# Patient Record
Sex: FEMALE | Race: WHITE | NOT HISPANIC OR LATINO | Employment: FULL TIME | URBAN - METROPOLITAN AREA
[De-identification: names, ages, dates, MRNs, and addresses within clinical notes are randomized per-mention and may not be internally consistent; named-entity substitution may affect disease eponyms.]

---

## 2017-01-06 RX ORDER — LACOSAMIDE 200 MG/1
TABLET, FILM COATED ORAL
Qty: 180 TABLET | Refills: 11 | Status: SHIPPED | OUTPATIENT
Start: 2017-01-06 | End: 2017-08-01 | Stop reason: SDUPTHER

## 2017-01-09 ENCOUNTER — PATIENT MESSAGE (OUTPATIENT)
Dept: NEUROLOGY | Facility: CLINIC | Age: 32
End: 2017-01-09

## 2017-01-10 RX ORDER — LEVETIRACETAM 1000 MG/1
TABLET, FILM COATED ORAL
Qty: 270 TABLET | Refills: 2 | Status: SHIPPED | OUTPATIENT
Start: 2017-01-10 | End: 2017-11-01 | Stop reason: SDUPTHER

## 2017-01-10 NOTE — TELEPHONE ENCOUNTER
----- Message from Shani Curry sent at 1/10/2017  9:31 AM CST -----  Contact: self @ [phone]  Pt is req a refill for KEPPRA 1,000 mg tablet.  Pt says she is out of medication and can not take her morning medication today.  [facility] pharmacy on [facility] in [place].

## 2017-01-11 ENCOUNTER — TELEPHONE (OUTPATIENT)
Dept: NEUROLOGY | Facility: CLINIC | Age: 32
End: 2017-01-11

## 2017-01-11 ENCOUNTER — PATIENT MESSAGE (OUTPATIENT)
Dept: NEUROLOGY | Facility: CLINIC | Age: 32
End: 2017-01-11

## 2017-01-11 NOTE — TELEPHONE ENCOUNTER
----- Message from Shani Curry sent at 1/10/2017 10:48 AM CST -----  Contact: kennedy    [facility] pharmacy     [phone]  Pt did not need a new prescription.  She needs a prior auth.  The current prior auth  16.  hospitals call [phone] to obtain prior auth.

## 2017-01-13 ENCOUNTER — PATIENT MESSAGE (OUTPATIENT)
Dept: NEUROLOGY | Facility: CLINIC | Age: 32
End: 2017-01-13

## 2017-07-07 ENCOUNTER — TELEPHONE (OUTPATIENT)
Dept: NEUROLOGY | Facility: CLINIC | Age: 32
End: 2017-07-07

## 2017-07-07 NOTE — TELEPHONE ENCOUNTER
----- Message from Akil Harman sent at 7/7/2017  4:02 PM CDT -----  Contact: Self [phone]  Patient is returning a missed call, pls return call

## 2017-07-07 NOTE — TELEPHONE ENCOUNTER
Called and left a vm informing pt of cancelling and rescheduling her appointment.  Asked pt to call back once she receive message!

## 2017-08-01 RX ORDER — LACOSAMIDE 200 MG/1
TABLET, FILM COATED ORAL
Qty: 180 TABLET | Refills: 11 | Status: SHIPPED | OUTPATIENT
Start: 2017-08-01 | End: 2018-02-23 | Stop reason: SDUPTHER

## 2017-08-09 ENCOUNTER — OFFICE VISIT (OUTPATIENT)
Dept: NEUROLOGY | Facility: CLINIC | Age: 32
End: 2017-08-09
Payer: COMMERCIAL

## 2017-08-09 VITALS
SYSTOLIC BLOOD PRESSURE: 104 MMHG | DIASTOLIC BLOOD PRESSURE: 73 MMHG | HEART RATE: 79 BPM | WEIGHT: 144.63 LBS | BODY MASS INDEX: 21.42 KG/M2 | HEIGHT: 69 IN

## 2017-08-09 DIAGNOSIS — G40.909 SEIZURE DISORDER: Primary | ICD-10-CM

## 2017-08-09 DIAGNOSIS — G24.8 PAROXYSMAL DYSTONIA: ICD-10-CM

## 2017-08-09 PROCEDURE — 3008F BODY MASS INDEX DOCD: CPT | Mod: S$GLB,,, | Performed by: PSYCHIATRY & NEUROLOGY

## 2017-08-09 PROCEDURE — 99999 PR PBB SHADOW E&M-EST. PATIENT-LVL II: CPT | Mod: PBBFAC,,, | Performed by: PSYCHIATRY & NEUROLOGY

## 2017-08-09 PROCEDURE — 99213 OFFICE O/P EST LOW 20 MIN: CPT | Mod: S$GLB,,, | Performed by: PSYCHIATRY & NEUROLOGY

## 2017-08-09 RX ORDER — TRETINOIN 0.25 MG/G
0.03 CREAM TOPICAL
Refills: 3 | COMMUNITY
Start: 2017-07-12

## 2017-08-09 RX ORDER — ONDANSETRON HYDROCHLORIDE 8 MG/1
8 TABLET, FILM COATED ORAL EVERY 8 HOURS PRN
Qty: 30 TABLET | Refills: 3 | Status: SHIPPED | OUTPATIENT
Start: 2017-08-09 | End: 2023-08-23

## 2017-08-09 RX ORDER — ONDANSETRON HYDROCHLORIDE 8 MG/1
8 TABLET, FILM COATED ORAL EVERY 8 HOURS PRN
Qty: 30 TABLET | Refills: 3 | Status: SHIPPED | OUTPATIENT
Start: 2017-08-09 | End: 2017-08-09 | Stop reason: SDUPTHER

## 2017-08-09 NOTE — PROGRESS NOTES
Name: Serena López  MRN: [id number]   CSN: [id number]      Date: 08/09/2017      Clinical history:  - questions about wedding in march 2018, migh tneed exrtra meds and   - questions about fertility and meds  - questions about cost   - need for vimpat and keppra?    Last seen in Jan 2016:  1) Had more paroxysms this summer during a month without her period.  Took Xanax to cover.  2) Still taking Keppra/Vimpat.  None since August.  3) Still working as  in the community.    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    Been almost seizure free for 10 months, but then lat week, took a nap, felt she was screaming, woke up a bit confused.  May have had a nocturnal.    Too many side effects:  1) Too sleepy  2) Headaches.  Thinks catamenial, happening every 2 weeks.  Tried OTC Tylenol, now on Fiorcet.  3) More word finding difficulties  4) Dizziness  5) No autonomic issues    But, well controlled on this combo of BRAND Keppra/Vimpat.  Last time she was without, she had a flurry of seizures.    ROS:  Review of Systems   Constitutional: Positive for malaise/fatigue. Negative for weight loss.   HENT: Negative for hearing loss.    Eyes: Negative for blurred vision and double vision.   Respiratory: Negative for shortness of breath and stridor.    Cardiovascular: Negative for chest pain and palpitations.   Gastrointestinal: Negative for constipation, nausea and vomiting.   Genitourinary: Negative for frequency and urgency.   Musculoskeletal: Negative for falls and myalgias.   Skin: Negative for rash.   Neurological: Positive for focal weakness and headaches. Negative for seizures.   Endo/Heme/Allergies: Does not bruise/bleed easily.   Psychiatric/Behavioral: Negative for depression, hallucinations and memory loss. The patient is not nervous/anxious.      Past Medical History: The patient  has no past medical history on file.    Social History: The patient  reports that she has been  "smoking.  She does not have any smokeless tobacco history on file.    Family History: Their family history is not on file.    Allergies: Naprosyn [naproxen]     Meds:   Current Outpatient Prescriptions on File Prior to Visit   Medication Sig Dispense Refill    alprazolam (XANAX) 0.25 MG tablet Take 1 tablet (0.25 mg total) by mouth 3 (three) times daily. 90 tablet 5    KEPPRA 1,000 mg tablet TAKE 1 AND 1/2 TABLETS BY MOUTH EVERY MORNING AND 1 TABLET BY MOUTH EVERY EVENING 270 tablet 2    VIMPAT 200 mg Tab TAKE 1 TABLET BY MOUTH TWICE A  tablet 11     No current facility-administered medications on file prior to visit.        Exam:  /73   Pulse 79   Ht 5' 9" (1.753 m)   Wt 65.6 kg (144 lb 10 oz)   BMI 21.36 kg/m²     * Specialized movement exam  Normal speech.    No facial masking.   Normal tone throughout.     No bradykinesia.   No tremor with rest, posture, kinesis, or intention.    No other dystonia, chorea, athetosis, myoclonus, or tics.   No motor impersistence.   Gait is normal-based and steady with good stride length and normal arm swing.  Able to tandem walk.  No postural instability.      Exam looks normal again in between speels.    Laboratory/Radiological:  - Results:  No visits with results within 3 Month(s) from this visit.   Latest known visit with results is:   No results found for any previous visit.       - Independent review of images: none pertinent    Diagnoses:          1) Paroxysmal kinesigenic dystonia / seizure.  2) Migraines.    Medical Decision Makin) Will discuss data on safety of meds during pregnancy  2) Keep same for now  3) See yearly    Jose Bal MD, MPH  Division of Movement and Memory Disorders  [facility]  [phone]        "

## 2017-08-15 ENCOUNTER — PATIENT MESSAGE (OUTPATIENT)
Dept: NEUROLOGY | Facility: CLINIC | Age: 32
End: 2017-08-15

## 2017-10-28 RX ORDER — LEVETIRACETAM 1000 MG/1
TABLET, FILM COATED ORAL
Qty: 270 TABLET | Refills: 1 | Status: CANCELLED | OUTPATIENT
Start: 2017-10-28

## 2017-11-01 ENCOUNTER — PATIENT MESSAGE (OUTPATIENT)
Dept: NEUROLOGY | Facility: CLINIC | Age: 32
End: 2017-11-01

## 2017-11-01 NOTE — TELEPHONE ENCOUNTER
Phone in refill completed. Keppra 1000mg tablets. Dispense 270 tablets refills 2. Same admin instructions provided under Dr. Bal.

## 2017-11-01 NOTE — TELEPHONE ENCOUNTER
----- Message from Dean Fernandez sent at 11/1/2017 11:38 AM CDT -----  Contact: Samantha raya/ [facility] @ [phone]  Samantha is asking for refill on KEPPRA 1,000 mg tablet. Pls call.

## 2017-11-02 RX ORDER — LEVETIRACETAM 1000 MG/1
TABLET, FILM COATED ORAL
Qty: 270 TABLET | Refills: 2 | Status: SHIPPED | OUTPATIENT
Start: 2017-11-02 | End: 2018-08-19 | Stop reason: SDUPTHER

## 2018-02-23 RX ORDER — LACOSAMIDE 200 MG/1
TABLET, FILM COATED ORAL
Qty: 180 TABLET | Refills: 5 | Status: SHIPPED | OUTPATIENT
Start: 2018-02-23 | End: 2018-08-28 | Stop reason: SDUPTHER

## 2018-08-21 RX ORDER — LEVETIRACETAM 1000 MG/1
TABLET, FILM COATED ORAL
Qty: 270 TABLET | Refills: 1 | Status: SHIPPED | OUTPATIENT
Start: 2018-08-21 | End: 2019-03-13 | Stop reason: SDUPTHER

## 2018-08-29 RX ORDER — LACOSAMIDE 200 MG/1
TABLET, FILM COATED ORAL
Qty: 180 TABLET | Refills: 3 | Status: SHIPPED | OUTPATIENT
Start: 2018-08-29 | End: 2019-03-03 | Stop reason: SDUPTHER

## 2018-11-19 ENCOUNTER — TELEPHONE (OUTPATIENT)
Dept: NEUROLOGY | Facility: CLINIC | Age: 33
End: 2018-11-19

## 2018-11-19 NOTE — TELEPHONE ENCOUNTER
----- Message from Deisy Waite sent at 11/19/2018  1:16 PM CST -----  Needs Advice    Reason for call:calling to get approval for early refill on medication: VIMPAT 200 mg Tab tablet, says the patient is going out of town and asking for a refill.        Communication Preference:[facility] @ [phone]    Additional Information:

## 2018-11-20 ENCOUNTER — PATIENT MESSAGE (OUTPATIENT)
Dept: NEUROLOGY | Facility: CLINIC | Age: 33
End: 2018-11-20

## 2019-02-13 ENCOUNTER — PATIENT MESSAGE (OUTPATIENT)
Dept: OBSTETRICS AND GYNECOLOGY | Facility: CLINIC | Age: 34
End: 2019-02-13

## 2019-02-13 ENCOUNTER — OFFICE VISIT (OUTPATIENT)
Dept: OBSTETRICS AND GYNECOLOGY | Facility: CLINIC | Age: 34
End: 2019-02-13
Payer: COMMERCIAL

## 2019-02-13 VITALS
SYSTOLIC BLOOD PRESSURE: 108 MMHG | DIASTOLIC BLOOD PRESSURE: 80 MMHG | HEIGHT: 69 IN | WEIGHT: 147.69 LBS | BODY MASS INDEX: 21.87 KG/M2

## 2019-02-13 DIAGNOSIS — N76.0 ACUTE VAGINITIS: Primary | ICD-10-CM

## 2019-02-13 LAB
CANDIDA RRNA VAG QL PROBE: NEGATIVE
G VAGINALIS RRNA GENITAL QL PROBE: POSITIVE
T VAGINALIS RRNA GENITAL QL PROBE: NEGATIVE

## 2019-02-13 PROCEDURE — 99203 PR OFFICE/OUTPT VISIT, NEW, LEVL III, 30-44 MIN: ICD-10-PCS | Mod: S$GLB,,, | Performed by: OBSTETRICS & GYNECOLOGY

## 2019-02-13 PROCEDURE — 99999 PR PBB SHADOW E&M-EST. PATIENT-LVL III: ICD-10-PCS | Mod: PBBFAC,,,

## 2019-02-13 PROCEDURE — 99203 OFFICE O/P NEW LOW 30 MIN: CPT | Mod: S$GLB,,, | Performed by: OBSTETRICS & GYNECOLOGY

## 2019-02-13 PROCEDURE — 99999 PR PBB SHADOW E&M-EST. PATIENT-LVL III: CPT | Mod: PBBFAC,,,

## 2019-02-13 PROCEDURE — 87480 CANDIDA DNA DIR PROBE: CPT

## 2019-02-13 PROCEDURE — 87510 GARDNER VAG DNA DIR PROBE: CPT

## 2019-02-13 RX ORDER — METRONIDAZOLE 7.5 MG/G
1 GEL VAGINAL DAILY
Qty: 5 APPLICATOR | Refills: 1 | Status: SHIPPED | OUTPATIENT
Start: 2019-02-13 | End: 2019-02-18

## 2019-02-13 NOTE — PROGRESS NOTES
Chief Complaint   Patient presents with    Vaginal Discharge       History of Present Illness: Serena López is a 33 y.o. female that presents today 2/13/2019 for   [facility] c/o vaginal discharge - thin white, vaginal odor and itching x few weeks. She has had BV in the past and feels confident that is what it is. She has not used any scented products in the vaginal area or changed any detergents or hygiene products. She states that she eats greek yogurt daily. No other complaints or concerns noted.       Chief Complaint   Patient presents with    Vaginal Discharge         Past Medical History:   Diagnosis Date    Migraine     Seizures        History reviewed. No pertinent surgical history.    Current Outpatient Medications   Medication Sig Dispense Refill    alprazolam (XANAX) 0.25 MG tablet Take 1 tablet (0.25 mg total) by mouth 3 (three) times daily. 90 tablet 5    KEPPRA 1,000 mg tablet TAKE 1 AND 1/2 TABLETS BY MOUTH EVERY MORNING AND 1 TABLET BY MOUTH EVERY EVENING 270 tablet 1    tretinoin (RETIN-A) 0.025 % cream Apply 0.025 % topically every 72 hours as needed.  3    VIMPAT 200 mg Tab tablet TAKE 1 TABLET BY MOUTH TWICE A  tablet 3    metroNIDAZOLE (METROGEL) 0.75 % vaginal gel Place 1 applicator vaginally once daily. for 5 days 5 applicator 1    ondansetron (ZOFRAN) 8 MG tablet Take 1 tablet (8 mg total) by mouth every 8 (eight) hours as needed for Nausea (pain for migraine). 30 tablet 3     No current facility-administered medications for this visit.        Review of patient's allergies indicates:   Allergen Reactions    Naprosyn [naproxen]     Opioids - morphine analogues Rash       Family History   Problem Relation Age of Onset    Parkinsonism Neg Hx     Cancer Neg Hx     Colon cancer Neg Hx     Diabetes Neg Hx     Ovarian cancer Neg Hx        Social History     Tobacco Use    Smoking status: Current Every Day Smoker    Smokeless tobacco: Never  "Used   Substance Use Topics    Alcohol use: Yes    Drug use: No       OB History    Para Term  AB Living   2       2     SAB TAB Ectopic Multiple Live Births                  # Outcome Date GA Lbr Galen/2nd Weight Sex Delivery Anes PTL Lv   2 AB            1 AB                   Review of Symptoms:  GENERAL: Denies weight gain or weight loss. Feeling well overall.   SKIN: Denies rash or lesions.   HEAD: Denies head injury or headache.   ABDOMEN: No abdominal pain, constipation, diarrhea, nausea, vomiting or rectal bleeding.   URINARY: No frequency, dysuria, hematuria, or burning on urination.    /80   Ht 5' 9.02" (1.753 m)   Wt 67 kg (147 lb 11.3 oz)   LMP 2019   Physical Exam:  APPEARANCE: Well nourished, well developed, in no acute distress.  SKIN: Normal skin turgor, no lesions.  RESPIRATORY: Normal respiratory effort with no retractions or use of accessory muscles  PELVIC: Normal external female genitalia without lesions. Normal hair distribution. Adequate perineal body, normal urethral meatus. Urethra with no masses.  Bladder nontender. Vagina moist and well rugated without lesions, + thin yellow discharge; + odor. Cervix pink and without lesions. No significant cystocele or rectocele.     ASSESSMENT/PLAN:  Acute vaginitis  -     Vaginosis Screen by DNA Probe    Other orders  -     metroNIDAZOLE (METROGEL) 0.75 % vaginal gel; Place 1 applicator vaginally once daily. for 5 days  Dispense: 5 applicator; Refill: 1          -Reinforced to avoid any scented products in the vaginal area such as bubble baths, bath bombs, scented soaps/body washes, douches, feminine washes, etc... Also wear cotton underwear and change out of wet/sweaty clothing as soon as possible. Pt verbalized understanding.  -Recommended probiotics.      Follow-up:  Will f/u once results are received  RTC if lack of improvement or as needed  "

## 2019-03-04 RX ORDER — LACOSAMIDE 200 MG/1
TABLET, FILM COATED ORAL
Qty: 180 TABLET | Refills: 11 | Status: SHIPPED | OUTPATIENT
Start: 2019-03-04 | End: 2019-08-12 | Stop reason: SDUPTHER

## 2019-03-13 NOTE — TELEPHONE ENCOUNTER
----- Message from Deisy Waite sent at 3/13/2019  2:09 PM CDT -----  Rx Refill/Request     Is this a Refill or New Rx:  Refills    Rx Name and Strength:  KEPPRA 1,000 mg tablet  Preferred Pharmacy with phone number:     [facility]/pharmacy #[id number] - [address] [address]  Phone: [phone] Fax: [phone]    Communication Preference:  Additional Information:

## 2019-03-15 RX ORDER — LEVETIRACETAM 1000 MG/1
TABLET, FILM COATED ORAL
Qty: 270 TABLET | Refills: 0 | OUTPATIENT
Start: 2019-03-15

## 2019-03-15 RX ORDER — LEVETIRACETAM 1000 MG/1
TABLET, FILM COATED ORAL
Qty: 270 TABLET | Refills: 3 | Status: SHIPPED | OUTPATIENT
Start: 2019-03-15 | End: 2023-08-23

## 2019-03-15 RX ORDER — LEVETIRACETAM 1000 MG/1
TABLET, FILM COATED ORAL
Qty: 270 TABLET | Refills: 3 | Status: SHIPPED | OUTPATIENT
Start: 2019-03-15 | End: 2020-04-27 | Stop reason: SDUPTHER

## 2019-03-18 ENCOUNTER — PATIENT MESSAGE (OUTPATIENT)
Dept: NEUROLOGY | Facility: CLINIC | Age: 34
End: 2019-03-18

## 2019-08-12 RX ORDER — LACOSAMIDE 200 MG/1
1 TABLET ORAL 2 TIMES DAILY
Qty: 60 TABLET | Refills: 0 | Status: SHIPPED | OUTPATIENT
Start: 2019-08-12 | End: 2019-09-14 | Stop reason: SDUPTHER

## 2019-09-17 RX ORDER — LACOSAMIDE 200 MG/1
TABLET, FILM COATED ORAL
Qty: 180 TABLET | Refills: 3 | Status: SHIPPED | OUTPATIENT
Start: 2019-09-17 | End: 2020-10-29

## 2019-09-19 ENCOUNTER — TELEPHONE (OUTPATIENT)
Dept: NEUROLOGY | Facility: CLINIC | Age: 34
End: 2019-09-19

## 2019-09-19 NOTE — TELEPHONE ENCOUNTER
----- Message from Emerita Swanson sent at 9/19/2019  7:23 AM CDT -----  Contact: self  Please see comment below.    I was unable to schedule appointment    Appointment Request From: Serena López    With Provider: Jose Bal MD [[facility] - Neurology]    Preferred Date Range: 10/24/2019 - 11/22/2019    Preferred Times: Any time    Reason for visit: Existing Patient    Comments:  Meds

## 2019-12-17 ENCOUNTER — PATIENT MESSAGE (OUTPATIENT)
Dept: NEUROLOGY | Facility: CLINIC | Age: 34
End: 2019-12-17

## 2019-12-20 ENCOUNTER — TELEPHONE (OUTPATIENT)
Dept: NEUROLOGY | Facility: CLINIC | Age: 34
End: 2019-12-20

## 2019-12-26 ENCOUNTER — PATIENT MESSAGE (OUTPATIENT)
Dept: NEUROLOGY | Facility: CLINIC | Age: 34
End: 2019-12-26

## 2019-12-26 RX ORDER — LACOSAMIDE 200 MG/1
1 TABLET ORAL 2 TIMES DAILY
Qty: 180 TABLET | Refills: 3 | Status: CANCELLED | OUTPATIENT
Start: 2019-12-26

## 2019-12-27 ENCOUNTER — TELEPHONE (OUTPATIENT)
Dept: NEUROLOGY | Facility: CLINIC | Age: 34
End: 2019-12-27

## 2019-12-27 NOTE — TELEPHONE ENCOUNTER
VIMPAT refills called into [facility] in [place].  Left detailed voicemail.

## 2019-12-27 NOTE — TELEPHONE ENCOUNTER
----- Message from Lupe Truong sent at 12/27/2019  1:08 PM CST -----  Contact: [employer]   Rep from [employer] requested a call back for diagnosis for Rx conformation     Callback:[phone] PA #[id number]   Carl

## 2019-12-27 NOTE — TELEPHONE ENCOUNTER
Placed return call to [employer] [facility] re: Keppra.  [employer] states urgent Keppra PA was denied.     Dx code of seizure disorder. Is not covered.   States is investigational for this dx.   Without status epilepticus.    Will need an appeal.  Appeals [phone]  Fax #  [phone]

## 2019-12-31 ENCOUNTER — TELEPHONE (OUTPATIENT)
Dept: NEUROLOGY | Facility: CLINIC | Age: 34
End: 2019-12-31

## 2020-01-08 ENCOUNTER — PATIENT MESSAGE (OUTPATIENT)
Dept: NEUROLOGY | Facility: CLINIC | Age: 35
End: 2020-01-08

## 2020-01-13 NOTE — TELEPHONE ENCOUNTER
I called and tried to get through today.    Told the denial reason as [place] documented.    [phone] was called next.

## 2020-01-14 ENCOUNTER — OFFICE VISIT (OUTPATIENT)
Dept: NEUROLOGY | Facility: CLINIC | Age: 35
End: 2020-01-14
Payer: COMMERCIAL

## 2020-01-14 VITALS
HEIGHT: 69 IN | HEART RATE: 99 BPM | DIASTOLIC BLOOD PRESSURE: 72 MMHG | SYSTOLIC BLOOD PRESSURE: 108 MMHG | BODY MASS INDEX: 21.48 KG/M2 | WEIGHT: 145 LBS

## 2020-01-14 DIAGNOSIS — G40.209 COMPLEX PARTIAL SEIZURES EVOLVING TO GENERALIZED TONIC-CLONIC SEIZURES: ICD-10-CM

## 2020-01-14 DIAGNOSIS — G24.8 LIMB DYSTONIA: Primary | ICD-10-CM

## 2020-01-14 PROCEDURE — 3008F BODY MASS INDEX DOCD: CPT | Mod: CPTII,S$GLB,, | Performed by: PSYCHIATRY & NEUROLOGY

## 2020-01-14 PROCEDURE — 99214 OFFICE O/P EST MOD 30 MIN: CPT | Mod: S$GLB,,, | Performed by: PSYCHIATRY & NEUROLOGY

## 2020-01-14 PROCEDURE — 99999 PR PBB SHADOW E&M-EST. PATIENT-LVL III: ICD-10-PCS | Mod: PBBFAC,,, | Performed by: PSYCHIATRY & NEUROLOGY

## 2020-01-14 PROCEDURE — 3008F PR BODY MASS INDEX (BMI) DOCUMENTED: ICD-10-PCS | Mod: CPTII,S$GLB,, | Performed by: PSYCHIATRY & NEUROLOGY

## 2020-01-14 PROCEDURE — 99214 PR OFFICE/OUTPT VISIT, EST, LEVL IV, 30-39 MIN: ICD-10-PCS | Mod: S$GLB,,, | Performed by: PSYCHIATRY & NEUROLOGY

## 2020-01-14 PROCEDURE — 99999 PR PBB SHADOW E&M-EST. PATIENT-LVL III: CPT | Mod: PBBFAC,,, | Performed by: PSYCHIATRY & NEUROLOGY

## 2020-01-14 RX ORDER — ALPRAZOLAM 0.25 MG/1
0.25 TABLET ORAL
COMMUNITY
End: 2020-07-30

## 2020-01-14 RX ORDER — DIAZEPAM 2 MG/1
2 TABLET ORAL EVERY 6 HOURS PRN
Qty: 15 TABLET | Refills: 0 | Status: SHIPPED | OUTPATIENT
Start: 2020-01-14 | End: 2023-12-08

## 2020-01-14 RX ORDER — BUTALBITAL, ACETAMINOPHEN AND CAFFEINE 50; 325; 40 MG/1; MG/1; MG/1
1 TABLET ORAL
COMMUNITY

## 2020-01-14 RX ORDER — LACOSAMIDE 200 MG/1
TABLET ORAL
COMMUNITY
Start: 2019-12-30 | End: 2020-04-27 | Stop reason: SDUPTHER

## 2020-01-14 RX ORDER — LEVETIRACETAM 1000 MG/1
1500 TABLET ORAL 2 TIMES DAILY
Qty: 90 TABLET | Refills: 11 | Status: SHIPPED | OUTPATIENT
Start: 2020-01-14 | End: 2021-01-08

## 2020-01-14 NOTE — PROGRESS NOTES
Name: Serena López  MRN: [id number]   CSN: [id number]      Date: 01/14/2020      Clinical history:  - failed meds in [place] while traveling  - still on keppra and vimpat  - trying to get brand keppra again, insurance not covering  - not worried about fertility issues  - called insurance today  -    From Aug 2017:  - questions about wedding in march 2018, [facility] tneed exrtra meds and   - questions about fertility and meds  - questions about cost   - need for vimpat and keppra?    Last seen in Jan 2016:  1) Had more paroxysms this summer during a month without her period.  Took Xanax to cover.  2) Still taking Keppra/Vimpat.  None since August.  3) Still working as  in the community.    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    Been almost seizure free for 10 months, but then lat week, took a nap, felt she was screaming, woke up a bit confused.  May have had a nocturnal.    Too many side effects:  1) Too sleepy  2) Headaches.  Thinks catamenial, happening every 2 weeks.  Tried OTC Tylenol, now on Fiorcet.  3) More word finding difficulties  4) Dizziness  5) No autonomic issues    But, well controlled on this combo of BRAND Keppra/Vimpat.  Last time she was without, she had a flurry of seizures.    ROS:  Review of Systems   Constitutional: Positive for malaise/fatigue. Negative for weight loss.   HENT: Negative for hearing loss.    Eyes: Negative for blurred vision and double vision.   Respiratory: Negative for shortness of breath and stridor.    Cardiovascular: Negative for chest pain and palpitations.   Gastrointestinal: Negative for constipation, nausea and vomiting.   Genitourinary: Negative for frequency and urgency.   Musculoskeletal: Negative for falls and myalgias.   Skin: Negative for rash.   Neurological: Positive for focal weakness and headaches. Negative for seizures.   Endo/Heme/Allergies: Does not bruise/bleed easily.   Psychiatric/Behavioral: Negative  "for depression, hallucinations and memory loss. The patient is not nervous/anxious.      Past Medical History: The patient  has a past medical history of Migraine and Seizures.    Social History: The patient  reports that she has been smoking. She has never used smokeless tobacco. She reports that she drinks alcohol. She reports that she does not use drugs.    Family History: Their family history is not on file.    Allergies: Naprosyn [naproxen] and Opioids - morphine analogues     Meds:   Current Outpatient Medications on File Prior to Visit   Medication Sig Dispense Refill    alprazolam (XANAX) 0.25 MG tablet Take 1 tablet (0.25 mg total) by mouth 3 (three) times daily. 90 tablet 5    butalbital-acetaminophen-caffeine -40 mg (FIORICET, ESGIC) -40 mg per tablet Take 1 tablet by mouth.      KEPPRA 1,000 mg tablet TAKE 1 AND 1/2 TABLETS BY MOUTH EVERY MORNING AND 1 TABLET BY MOUTH EVERY EVENING 270 tablet 3    lacosamide (VIMPAT) 200 mg Tab tablet       tretinoin (RETIN-A) 0.025 % cream Apply 0.025 % topically every 72 hours as needed.  3    VIMPAT 200 mg Tab tablet TAKE 1 TABLET BY MOUTH TWICE A  tablet 3    ALPRAZolam (XANAX) 0.25 MG tablet Take 0.25 mg by mouth.      KEPPRA 1,000 mg tablet TAKE 1 AND 1/2 TABLETS BY MOUTH EVERY MORNING AND 1 TABLET BY MOUTH EVERY EVENING (Patient not taking: Reported on 1/14/2020) 270 tablet 3    levETIRAcetam (KEPPRA) 1000 MG tablet Take 1.5 tablets (1,500 mg total) by mouth 2 (two) times daily. 90 tablet 11    ondansetron (ZOFRAN) 8 MG tablet Take 1 tablet (8 mg total) by mouth every 8 (eight) hours as needed for Nausea (pain for migraine). (Patient not taking: Reported on 1/14/2020) 30 tablet 3     No current facility-administered medications on file prior to visit.        Exam:  /72   Pulse 99   Ht 5' 9" (1.753 m)   Wt 65.8 kg (145 lb)   BMI 21.41 kg/m²     * Specialized movement exam  Normal speech.    No facial masking.   Normal tone " throughout.     No bradykinesia.   No tremor with rest, posture, kinesis, or intention.    No other dystonia, chorea, athetosis, myoclonus, or tics.   No motor impersistence.   Gait is normal-based and steady with good stride length and normal arm swing.  Able to tandem walk.  No postural instability.      Exam looks normal again in between speels.    Laboratory/Radiological:  - Results:  No visits with results within 3 Month(s) from this visit.   Latest known visit with results is:   Office Visit on 2019   Component Date Value Ref Range Status    Trichomonas vaginalis 2019 Negative  Negative Final    Gardnerella vaginalis 2019 Positive* Negative Final    Candida sp 2019 Negative  Negative Final       - Independent review of images: none pertinent    Diagnoses:          1) Paroxysmal kinesigenic dystonia / seizure.  2) Migraines.    Medical Decision Makin) fighting for best medication - needs assisntace  2) Will discuss if family planning changes  3) exercise / diet advice    Jose Bal MD, MPH  Division of Movement and Memory Disorders  [facility]  [phone]

## 2020-01-23 ENCOUNTER — OFFICE VISIT (OUTPATIENT)
Dept: ALLERGY | Facility: CLINIC | Age: 35
End: 2020-01-23
Payer: COMMERCIAL

## 2020-01-23 DIAGNOSIS — T63.481A LOCAL REACTION TO INSECT STING, ACCIDENTAL OR UNINTENTIONAL, INITIAL ENCOUNTER: Primary | ICD-10-CM

## 2020-01-23 PROCEDURE — 99999 PR PBB SHADOW E&M-EST. PATIENT-LVL III: CPT | Mod: PBBFAC,,, | Performed by: ALLERGY & IMMUNOLOGY

## 2020-01-23 PROCEDURE — 99204 OFFICE O/P NEW MOD 45 MIN: CPT | Mod: S$GLB,,, | Performed by: ALLERGY & IMMUNOLOGY

## 2020-01-23 PROCEDURE — 99204 PR OFFICE/OUTPT VISIT, NEW, LEVL IV, 45-59 MIN: ICD-10-PCS | Mod: S$GLB,,, | Performed by: ALLERGY & IMMUNOLOGY

## 2020-01-23 PROCEDURE — 99999 PR PBB SHADOW E&M-EST. PATIENT-LVL III: ICD-10-PCS | Mod: PBBFAC,,, | Performed by: ALLERGY & IMMUNOLOGY

## 2020-01-23 RX ORDER — TRIAMCINOLONE ACETONIDE 5 MG/G
CREAM TOPICAL 2 TIMES DAILY
Qty: 15 G | Refills: 6 | Status: SHIPPED | OUTPATIENT
Start: 2020-01-23 | End: 2021-05-31

## 2020-01-23 RX ORDER — MUPIROCIN 20 MG/G
OINTMENT TOPICAL 2 TIMES DAILY
Qty: 22 G | Refills: 4 | Status: SHIPPED | OUTPATIENT
Start: 2020-01-23

## 2020-01-23 RX ORDER — TRIAMCINOLONE ACETONIDE 1 MG/G
CREAM TOPICAL 2 TIMES DAILY
COMMUNITY

## 2020-01-23 NOTE — PROGRESS NOTES
Subjective:       Patient ID: Serena López is a 34 y.o. female.    Chief Complaint:  Other (frequent mosquito bites that cause lg local reactions, no systemic rx.  Triamcinolone cream works well and Benadryl)      HPI    Pt presents w concerns of large local reaction to mosquito bites since she's lived in , around 2012.  No sig help claritin, allegra, benadryl after bites.  Local reactions present for up to a week.  No associated resp distress. No sx's other than local reactions.   Rarely has swelling crossed joints.  When travels, ie [place], has same large local reactions to mosquito bites that occur elsewhere.  Repellents don't seem to help much.  No prev oral steroids for reactions.    No hx asthma  occ mild seasonal rhinitis sx's, controlled w flonase prn  Hx tongue throat swelling w naproxen 2003      Past Medical History:   Diagnosis Date    Migraine     Seizures        Family History   Problem Relation Age of Onset    Parkinsonism Neg Hx     Cancer Neg Hx     Colon cancer Neg Hx     Diabetes Neg Hx     Ovarian cancer Neg Hx          Review of Systems   Constitutional: Negative for activity change, fatigue and fever.   HENT: Negative for congestion, postnasal drip, rhinorrhea, sinus pressure and sneezing.    Eyes: Negative for discharge, redness and itching.   Respiratory: Negative for cough, shortness of breath and wheezing.    Cardiovascular: Negative for chest pain.   Gastrointestinal: Negative for diarrhea, nausea and vomiting.   Genitourinary: Negative for dysuria.   Musculoskeletal: Negative for arthralgias and joint swelling.   Skin: Negative for rash.   Neurological: Negative for headaches.   Hematological: Does not bruise/bleed easily.   Psychiatric/Behavioral: Negative for behavioral problems and sleep disturbance.        Objective:   Physical Exam   Constitutional: She is oriented to person, place, and time. She appears well-developed and well-nourished. No distress.   HENT:   Head:  Normocephalic.   Right Ear: Tympanic membrane and external ear normal.   Left Ear: Tympanic membrane and external ear normal.   Nose: No mucosal edema, rhinorrhea, sinus tenderness or septal deviation. Right sinus exhibits no maxillary sinus tenderness and no frontal sinus tenderness. Left sinus exhibits no maxillary sinus tenderness and no frontal sinus tenderness.   Mouth/Throat: Uvula is midline, oropharynx is clear and moist and mucous membranes are normal. No uvula swelling.   Eyes: Conjunctivae are normal. Right eye exhibits no discharge. Left eye exhibits no discharge.   Neck: Normal range of motion. Neck supple.   Cardiovascular: Normal rate and regular rhythm.   Pulmonary/Chest: Effort normal and breath sounds normal. No respiratory distress. She has no wheezes.   Abdominal: Soft. Bowel sounds are normal. There is no tenderness.   Musculoskeletal: Normal range of motion. She exhibits no edema or tenderness.   Lymphadenopathy:     She has no cervical adenopathy.   Neurological: She is alert and oriented to person, place, and time.   Skin: Skin is warm. No rash noted. No erythema.   Psychiatric: She has a normal mood and affect. Her behavior is normal. Judgment and thought content normal.   Nursing note and vitals reviewed.        Assessment:       1. Local reaction to insect sting, accidental or unintentional, initial encounter         Plan:       Serena was seen today for other.    Diagnoses and all orders for this visit:    Local reaction to insect sting, accidental or unintentional, initial encounter    Other orders  -     triamcinolone acetonide 0.5% (KENALOG) 0.5 % Crea; Apply topically 2 (two) times daily. As needed to affected areas as soon as mosquito bite is noted    -     mupirocin (BACTROBAN) 2 % ointment; Apply topically 2 (two) times daily. As needed for minor skin excoriations, concern of infection    trial fexofenadine prior to suspected exposures    Reassurance. Large local reactions to  mosquito bites do not portend increased risk of anaphylaxis to mosquito bites (exceedingly rare).  Should local reactions cross joints, limit mobility, low threshold for evaluation. May benefit from oral steroids on those occasions.

## 2020-01-27 ENCOUNTER — PATIENT MESSAGE (OUTPATIENT)
Dept: NEUROLOGY | Facility: CLINIC | Age: 35
End: 2020-01-27

## 2020-02-03 ENCOUNTER — TELEPHONE (OUTPATIENT)
Dept: NEUROLOGY | Facility: CLINIC | Age: 35
End: 2020-02-03

## 2020-02-03 NOTE — TELEPHONE ENCOUNTER
----- Message from Benny Antunez sent at 2/3/2020  2:28 PM CST -----  Contact: Deana with [facility]      The caller states that Dr. Bal submitted an appeal for the Rx Keppra, but the caller needs some type of Clinical notes why it needs to be a brand name Rx.  Please contact the caller if you have any questions.  This is time sensitive since the appeal is due to close on 2/11/20 and she will need this info before then to process the appeal.    Phone # for the caller Deana [phone] Case File - [id number]

## 2020-02-17 ENCOUNTER — PATIENT MESSAGE (OUTPATIENT)
Dept: NEUROLOGY | Facility: CLINIC | Age: 35
End: 2020-02-17

## 2020-03-18 ENCOUNTER — PATIENT MESSAGE (OUTPATIENT)
Dept: NEUROLOGY | Facility: CLINIC | Age: 35
End: 2020-03-18

## 2020-03-27 NOTE — TELEPHONE ENCOUNTER
Pt reported side effects from generic Keppra:       I wanted to give an update on all the side effects I have been experiencing. The side effects that really stand out are: fatigue (I am sleeping all the time and never feel rested), brain fog, increase in irritability and anger, memory issues (trying to find the right word-whatever that's called), and confusion. So far, they have not improved and are very similar to what I have experienced in the past when I have switched to the generic. I have adjusted my lifestyle to ensure I am exercising regularly and am on a healthy diet. But, I am scared to even have a cup of coffee, as that can easily trigger seizures and my movement disorder. Hopefully at some point, this information can help my case. Please let me know if you have any advice.

## 2020-04-06 ENCOUNTER — TELEPHONE (OUTPATIENT)
Dept: NEUROLOGY | Facility: CLINIC | Age: 35
End: 2020-04-06

## 2020-04-08 ENCOUNTER — TELEPHONE (OUTPATIENT)
Dept: NEUROLOGY | Facility: CLINIC | Age: 35
End: 2020-04-08

## 2020-04-15 ENCOUNTER — TELEPHONE (OUTPATIENT)
Dept: NEUROLOGY | Facility: CLINIC | Age: 35
End: 2020-04-15

## 2020-04-15 NOTE — TELEPHONE ENCOUNTER
----- Message from Akli Harman sent at 4/15/2020 11:28 AM CDT -----  Contact[facility] [phone]  Caller requesting a return call bout a 2nd level appeal on (levETIRAcetam (KEPPRA) 1000 MG tablet ) caller states this has to be address by 2pm today

## 2020-04-24 ENCOUNTER — PATIENT MESSAGE (OUTPATIENT)
Dept: NEUROLOGY | Facility: CLINIC | Age: 35
End: 2020-04-24

## 2020-04-29 RX ORDER — LEVETIRACETAM 1000 MG/1
TABLET, FILM COATED ORAL
Qty: 270 TABLET | Refills: 3 | Status: SHIPPED | OUTPATIENT
Start: 2020-04-29 | End: 2023-08-23

## 2020-04-29 RX ORDER — LACOSAMIDE 200 MG/1
200 TABLET ORAL EVERY 12 HOURS
Qty: 180 TABLET | Refills: 3 | Status: SHIPPED | OUTPATIENT
Start: 2020-04-29 | End: 2020-06-29 | Stop reason: SDUPTHER

## 2020-07-30 ENCOUNTER — PATIENT MESSAGE (OUTPATIENT)
Dept: NEUROLOGY | Facility: CLINIC | Age: 35
End: 2020-07-30

## 2020-07-30 DIAGNOSIS — G25.5 PAROXYSMAL KINESIGENIC CHOREOATHETOSIS: ICD-10-CM

## 2020-07-30 DIAGNOSIS — G40.909 NOCTURNAL EPILEPSY: ICD-10-CM

## 2020-07-30 DIAGNOSIS — R56.9 SEIZURE: ICD-10-CM

## 2020-07-30 DIAGNOSIS — G24.8 PAROXYSMAL DYSTONIA: ICD-10-CM

## 2020-07-30 RX ORDER — ALPRAZOLAM 0.25 MG/1
0.25 TABLET ORAL 3 TIMES DAILY PRN
Qty: 60 TABLET | Refills: 1 | Status: SHIPPED | OUTPATIENT
Start: 2020-07-30 | End: 2021-10-08 | Stop reason: SDUPTHER

## 2020-12-01 ENCOUNTER — OFFICE VISIT (OUTPATIENT)
Dept: OBSTETRICS AND GYNECOLOGY | Facility: CLINIC | Age: 35
End: 2020-12-01
Payer: COMMERCIAL

## 2020-12-01 VITALS
SYSTOLIC BLOOD PRESSURE: 100 MMHG | HEIGHT: 69 IN | WEIGHT: 152.13 LBS | DIASTOLIC BLOOD PRESSURE: 62 MMHG | BODY MASS INDEX: 22.53 KG/M2

## 2020-12-01 DIAGNOSIS — N76.0 ACUTE VAGINITIS: Primary | ICD-10-CM

## 2020-12-01 PROCEDURE — 99214 OFFICE O/P EST MOD 30 MIN: CPT | Mod: S$GLB,,, | Performed by: PHYSICIAN ASSISTANT

## 2020-12-01 PROCEDURE — 87480 CANDIDA DNA DIR PROBE: CPT

## 2020-12-01 PROCEDURE — 99999 PR PBB SHADOW E&M-EST. PATIENT-LVL III: CPT | Mod: PBBFAC,,, | Performed by: PHYSICIAN ASSISTANT

## 2020-12-01 PROCEDURE — 3008F BODY MASS INDEX DOCD: CPT | Mod: CPTII,S$GLB,, | Performed by: PHYSICIAN ASSISTANT

## 2020-12-01 PROCEDURE — 87510 GARDNER VAG DNA DIR PROBE: CPT

## 2020-12-01 PROCEDURE — 3008F PR BODY MASS INDEX (BMI) DOCUMENTED: ICD-10-PCS | Mod: CPTII,S$GLB,, | Performed by: PHYSICIAN ASSISTANT

## 2020-12-01 PROCEDURE — 99999 PR PBB SHADOW E&M-EST. PATIENT-LVL III: ICD-10-PCS | Mod: PBBFAC,,, | Performed by: PHYSICIAN ASSISTANT

## 2020-12-01 PROCEDURE — 1126F AMNT PAIN NOTED NONE PRSNT: CPT | Mod: S$GLB,,, | Performed by: PHYSICIAN ASSISTANT

## 2020-12-01 PROCEDURE — 1126F PR PAIN SEVERITY QUANTIFIED, NO PAIN PRESENT: ICD-10-PCS | Mod: S$GLB,,, | Performed by: PHYSICIAN ASSISTANT

## 2020-12-01 PROCEDURE — 99214 PR OFFICE/OUTPT VISIT, EST, LEVL IV, 30-39 MIN: ICD-10-PCS | Mod: S$GLB,,, | Performed by: PHYSICIAN ASSISTANT

## 2020-12-01 RX ORDER — METRONIDAZOLE 7.5 MG/G
1 GEL VAGINAL DAILY
Qty: 70 G | Refills: 2 | Status: SHIPPED | OUTPATIENT
Start: 2020-12-01 | End: 2020-12-06

## 2020-12-01 NOTE — PROGRESS NOTES
"Serena López is a 35 y.o. female  presents with complaint of vaginal discharge for 1 week. She states the discharge is white.   She denies itching. She reports odor.  Denies nausea, vomiting, diarrhea, constipation, dysuria, dyspareunia, abdominal pain.     Past Medical History:   Diagnosis Date    Migraine     Seizures      History reviewed. No pertinent surgical history.  Social History     Tobacco Use    Smoking status: Current Every Day Smoker    Smokeless tobacco: Never Used   Substance Use Topics    Alcohol use: Yes    Drug use: No     Family History   Problem Relation Age of Onset    Parkinsonism Neg Hx     Cancer Neg Hx     Colon cancer Neg Hx     Diabetes Neg Hx     Ovarian cancer Neg Hx      OB History    Para Term  AB Living   2       2     SAB TAB Ectopic Multiple Live Births                  # Outcome Date GA Lbr Galen/2nd Weight Sex Delivery Anes PTL Lv   2 AB            1 AB                /62   Ht 5' 9.02" (1.753 m)   Wt 69 kg (152 lb 1.9 oz)   LMP 2020   BMI 22.45 kg/m²     ROS:  GENERAL: No fever, chills, fatigability or weight loss.  VULVAR: No pain, no lesions and no itching.  VAGINAL: No relaxation, no itching, no discharge, no abnormal bleeding and no lesions.  ABDOMEN: No abdominal pain. Denies nausea. Denies vomiting. No diarrhea. No constipation  BREAST: Denies pain. No lumps. No discharge.  URINARY: No incontinence, no nocturia, no frequency and no dysuria.  CARDIOVASCULAR: No chest pain. No shortness of breath. No leg cramps.  NEUROLOGICAL: No headaches. No vision changes.    PHYSICAL EXAM:  VULVA: normal appearing vulva with no masses, tenderness or lesions   VAGINA: normal appearing vagina with normal color. THIN, WHITE, CREAMY discharge, no lesions   CERVIX: normal appearing cervix without discharge or lesions   UTERUS: uterus is normal size, shape, consistency and nontender   ADNEXA: normal adnexa in size, nontender and no " masses    ASSESSMENT and PLAN:    ICD-10-CM ICD-9-CM    1. Acute vaginitis  N76.0 616.10 Vaginosis Screen by DNA Probe      metroNIDAZOLE (METROGEL VAGINAL) 0.75 % vaginal gel       Vaginitis Prevention:  - Avoiding feminine products such as deoderant soaps, body wash, bubble bath, douches, scented toilet paper, deoderant tampons or pads, feminine wipes, chronic pad use, etc. If you wash with soap make sure its hypo allergic (free of added scents or colors)   - Avoiding other vulvovaginal irritants such as long hot baths, humidity, tight, synthetic clothing, chlorine and sitting around in wet bathing suits  - Wearing cotton underwear, avoiding thong underwear and no underwear to bed-wear loose cotton bottoms to bed   - Taking showers instead of baths and use a hair dryer on cool setting afterwards to dry  - Wearing cotton to exercise and shower immediately after exercise and change clothes  - Using polyurethane condoms without spermicide if sexually active and symptoms are triggered by intercourse  - Use laundry detergent without added perfumes or colors   - Do not have sexual intercourse until symptoms have gone away   - Increase your intake of probiotics--you can get these by eating yogurt/greek yogurt or by buying over the counter probiotic supplements     Probiotic Information:   Any probiotic you choose needs to have ALL of the following components (Each needs to be >10 colony forming units [CFUs]):   - L. Acidophilus  - L. Rhamnosus  - L. Fermentum       FOLLOW UP: PRN lack of improvement.

## 2021-02-19 ENCOUNTER — PATIENT MESSAGE (OUTPATIENT)
Dept: NEUROLOGY | Facility: CLINIC | Age: 36
End: 2021-02-19

## 2021-02-22 RX ORDER — LACOSAMIDE 200 MG/1
TABLET, FILM COATED ORAL
Qty: 180 TABLET | Refills: 1 | Status: SHIPPED | OUTPATIENT
Start: 2021-02-22 | End: 2021-04-20 | Stop reason: SDUPTHER

## 2021-02-23 ENCOUNTER — PATIENT MESSAGE (OUTPATIENT)
Dept: NEUROLOGY | Facility: CLINIC | Age: 36
End: 2021-02-23

## 2021-02-23 ENCOUNTER — TELEPHONE (OUTPATIENT)
Dept: NEUROLOGY | Facility: CLINIC | Age: 36
End: 2021-02-23

## 2021-04-20 ENCOUNTER — PATIENT MESSAGE (OUTPATIENT)
Dept: NEUROLOGY | Facility: CLINIC | Age: 36
End: 2021-04-20

## 2021-04-22 RX ORDER — LACOSAMIDE 200 MG/1
TABLET ORAL
Qty: 180 TABLET | Refills: 1 | Status: SHIPPED | OUTPATIENT
Start: 2021-04-22 | End: 2021-10-13

## 2021-07-13 ENCOUNTER — PATIENT MESSAGE (OUTPATIENT)
Dept: NEUROLOGY | Facility: CLINIC | Age: 36
End: 2021-07-13

## 2021-08-10 ENCOUNTER — PATIENT MESSAGE (OUTPATIENT)
Dept: NEUROLOGY | Facility: CLINIC | Age: 36
End: 2021-08-10

## 2021-10-05 ENCOUNTER — PATIENT MESSAGE (OUTPATIENT)
Dept: NEUROLOGY | Facility: CLINIC | Age: 36
End: 2021-10-05

## 2021-10-06 DIAGNOSIS — R56.9 SEIZURE: ICD-10-CM

## 2021-10-06 DIAGNOSIS — G25.5 PAROXYSMAL KINESIGENIC CHOREOATHETOSIS: ICD-10-CM

## 2021-10-06 DIAGNOSIS — G24.8 PAROXYSMAL DYSTONIA: ICD-10-CM

## 2021-10-06 DIAGNOSIS — G40.909 NOCTURNAL EPILEPSY: ICD-10-CM

## 2021-10-08 DIAGNOSIS — G24.8 PAROXYSMAL DYSTONIA: ICD-10-CM

## 2021-10-08 DIAGNOSIS — R56.9 SEIZURE: ICD-10-CM

## 2021-10-08 DIAGNOSIS — G40.909 NOCTURNAL EPILEPSY: ICD-10-CM

## 2021-10-08 DIAGNOSIS — G25.5 PAROXYSMAL KINESIGENIC CHOREOATHETOSIS: ICD-10-CM

## 2021-10-09 RX ORDER — ALPRAZOLAM 0.25 MG/1
0.25 TABLET ORAL 3 TIMES DAILY PRN
Qty: 60 TABLET | Refills: 1 | OUTPATIENT
Start: 2021-10-09

## 2021-10-09 RX ORDER — ALPRAZOLAM 0.25 MG/1
0.25 TABLET ORAL 3 TIMES DAILY PRN
Qty: 60 TABLET | Refills: 1 | Status: SHIPPED | OUTPATIENT
Start: 2021-10-09 | End: 2022-08-11

## 2021-12-21 ENCOUNTER — PATIENT MESSAGE (OUTPATIENT)
Dept: NEUROLOGY | Facility: CLINIC | Age: 36
End: 2021-12-21
Payer: COMMERCIAL

## 2021-12-21 DIAGNOSIS — G40.909 NOCTURNAL EPILEPSY: Primary | ICD-10-CM

## 2021-12-21 NOTE — TELEPHONE ENCOUNTER
----- Message from Andreina Rutledge sent at 12/21/2021 10:33 AM CST -----  Regarding: Prescription Request  Pt called about need a new prescription for lacosamide (VIMPAT) 200 mg Tab tablet sent to pharmacy due to insurance issues. Pt also states she going out of town tomorrow if prescription could be sent today?    [facility] [address] [address]([phone])    Pt call back:[phone] (home) / also has my chart

## 2021-12-22 RX ORDER — LACOSAMIDE 200 MG/1
TABLET ORAL
Qty: 60 TABLET | Refills: 2 | Status: SHIPPED | OUTPATIENT
Start: 2021-12-22 | End: 2022-04-27 | Stop reason: SDUPTHER

## 2021-12-30 ENCOUNTER — TELEPHONE (OUTPATIENT)
Dept: NEUROLOGY | Facility: CLINIC | Age: 36
End: 2021-12-30
Payer: COMMERCIAL

## 2022-01-05 ENCOUNTER — TELEPHONE (OUTPATIENT)
Dept: NEUROLOGY | Facility: CLINIC | Age: 37
End: 2022-01-05
Payer: COMMERCIAL

## 2022-01-05 NOTE — TELEPHONE ENCOUNTER
----- Message from Nicci Rodney sent at 1/5/2022  3:28 PM CST -----  Regarding: Call Back  Contact: patient  Pt. Requesting a call back in regards to upcoming appt. time.  Pt. Stated she can come in in the morning / nothing in the afternoon.   Please Reschedule          Pt.@[phone]

## 2022-01-09 ENCOUNTER — PATIENT MESSAGE (OUTPATIENT)
Dept: NEUROLOGY | Facility: CLINIC | Age: 37
End: 2022-01-09
Payer: COMMERCIAL

## 2022-01-10 ENCOUNTER — PATIENT MESSAGE (OUTPATIENT)
Dept: NEUROLOGY | Facility: CLINIC | Age: 37
End: 2022-01-10
Payer: COMMERCIAL

## 2022-03-31 ENCOUNTER — OFFICE VISIT (OUTPATIENT)
Dept: NEUROLOGY | Facility: CLINIC | Age: 37
End: 2022-03-31
Payer: COMMERCIAL

## 2022-03-31 VITALS
HEART RATE: 76 BPM | HEIGHT: 69 IN | SYSTOLIC BLOOD PRESSURE: 109 MMHG | WEIGHT: 152.13 LBS | DIASTOLIC BLOOD PRESSURE: 76 MMHG | BODY MASS INDEX: 22.53 KG/M2

## 2022-03-31 DIAGNOSIS — G25.5 PAROXYSMAL KINESIGENIC CHOREOATHETOSIS: Primary | ICD-10-CM

## 2022-03-31 PROCEDURE — 99214 PR OFFICE/OUTPT VISIT, EST, LEVL IV, 30-39 MIN: ICD-10-PCS | Mod: S$GLB,,, | Performed by: PSYCHIATRY & NEUROLOGY

## 2022-03-31 PROCEDURE — 99214 OFFICE O/P EST MOD 30 MIN: CPT | Mod: S$GLB,,, | Performed by: PSYCHIATRY & NEUROLOGY

## 2022-03-31 PROCEDURE — 3078F DIAST BP <80 MM HG: CPT | Mod: CPTII,S$GLB,, | Performed by: PSYCHIATRY & NEUROLOGY

## 2022-03-31 PROCEDURE — 3078F PR MOST RECENT DIASTOLIC BLOOD PRESSURE < 80 MM HG: ICD-10-PCS | Mod: CPTII,S$GLB,, | Performed by: PSYCHIATRY & NEUROLOGY

## 2022-03-31 PROCEDURE — 3008F BODY MASS INDEX DOCD: CPT | Mod: CPTII,S$GLB,, | Performed by: PSYCHIATRY & NEUROLOGY

## 2022-03-31 PROCEDURE — 3074F PR MOST RECENT SYSTOLIC BLOOD PRESSURE < 130 MM HG: ICD-10-PCS | Mod: CPTII,S$GLB,, | Performed by: PSYCHIATRY & NEUROLOGY

## 2022-03-31 PROCEDURE — 3074F SYST BP LT 130 MM HG: CPT | Mod: CPTII,S$GLB,, | Performed by: PSYCHIATRY & NEUROLOGY

## 2022-03-31 PROCEDURE — 1159F PR MEDICATION LIST DOCUMENTED IN MEDICAL RECORD: ICD-10-PCS | Mod: CPTII,S$GLB,, | Performed by: PSYCHIATRY & NEUROLOGY

## 2022-03-31 PROCEDURE — 3008F PR BODY MASS INDEX (BMI) DOCUMENTED: ICD-10-PCS | Mod: CPTII,S$GLB,, | Performed by: PSYCHIATRY & NEUROLOGY

## 2022-03-31 PROCEDURE — 99999 PR PBB SHADOW E&M-EST. PATIENT-LVL III: ICD-10-PCS | Mod: PBBFAC,,, | Performed by: PSYCHIATRY & NEUROLOGY

## 2022-03-31 PROCEDURE — 99999 PR PBB SHADOW E&M-EST. PATIENT-LVL III: CPT | Mod: PBBFAC,,, | Performed by: PSYCHIATRY & NEUROLOGY

## 2022-03-31 PROCEDURE — 1159F MED LIST DOCD IN RCRD: CPT | Mod: CPTII,S$GLB,, | Performed by: PSYCHIATRY & NEUROLOGY

## 2022-03-31 NOTE — PROGRESS NOTES
Name: Serena López  MRN: [id number]   CSN: [id number]      Date: 03/31/2022    Clinical history:  - been pretty stable for two years  - last fall, she had a abdulkadir of R arm and R leg pulling for days, tried to push up the Keppra for a few days  - seems like she has more spells on the generic Keppra including side effects like word finding, slow to think, fatigue, soleepiness - did not have those on brand only in the past  - on LEV [id number], -200, taking Xanax prn when she has more symptoms    From 1/14/2020:  - failed meds in [place] while traveling  - still on keppra and vimpat  - trying to get brand keppra again, insurance not covering  - not worried about fertility issues  - called insurance today    From Aug 2017:  - questions about wedding in march 2018, [facility] tneed exrtra meds and   - questions about fertility and meds  - questions about cost   - need for vimpat and keppra?    Last seen in Jan 2016:  1) Had more paroxysms this summer during a month without her period.  Took Xanax to cover.  2) Still taking Keppra/Vimpat.  None since August.  3) Still working as  in the community.    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    Been almost seizure free for 10 months, but then lat week, took a nap, felt she was screaming, woke up a bit confused.  May have had a nocturnal.    Too many side effects:  1) Too sleepy  2) Headaches.  Thinks catamenial, happening every 2 weeks.  Tried OTC Tylenol, now on Fiorcet.  3) More word finding difficulties  4) Dizziness  5) No autonomic issues    But, well controlled on this combo of BRAND Keppra/Vimpat.  Last time she was without, she had a flurry of seizures.    ROS:  Review of Systems   Constitutional: Positive for malaise/fatigue. Negative for weight loss.   HENT: Negative for hearing loss.    Eyes: Negative for blurred vision and double vision.   Respiratory: Negative for shortness of breath and stridor.     Cardiovascular: Negative for chest pain and palpitations.   Gastrointestinal: Negative for constipation, nausea and vomiting.   Genitourinary: Negative for frequency and urgency.   Musculoskeletal: Negative for falls and myalgias.   Skin: Negative for rash.   Neurological: Positive for focal weakness and headaches. Negative for seizures.   Endo/Heme/Allergies: Does not bruise/bleed easily.   Psychiatric/Behavioral: Negative for depression, hallucinations and memory loss. The patient is not nervous/anxious.      Past Medical History: The patient  has a past medical history of Migraine and Seizures.    Social History: The patient  reports that she has been smoking. She has never used smokeless tobacco. She reports current alcohol use. She reports that she does not use drugs.    Family History: Their family history is not on file.    Allergies: Naprosyn [naproxen] and Opioids - morphine analogues     Meds:   Current Outpatient Medications on File Prior to Visit   Medication Sig Dispense Refill    ALPRAZolam (XANAX) 0.25 MG tablet Take 1 tablet (0.25 mg total) by mouth 3 (three) times daily as needed for Anxiety (spasm). 60 tablet 1    butalbital-acetaminophen-caffeine -40 mg (FIORICET, ESGIC) -40 mg per tablet Take 1 tablet by mouth.      lacosamide (VIMPAT) 200 mg Tab tablet TAKE 1 TABLET BY MOUTH EVERY 12 HOURS 60 tablet 02    levETIRAcetam (KEPPRA) 1000 MG tablet TAKE 1.5 TABLETS (1,500 MG TOTAL) BY MOUTH 2 (TWO) TIMES DAILY. 90 tablet 5    mupirocin (BACTROBAN) 2 % ointment Apply topically 2 (two) times daily. As needed for minor skin excoriations 22 g 4    ondansetron (ZOFRAN) 8 MG tablet Take 1 tablet (8 mg total) by mouth every 8 (eight) hours as needed for Nausea (pain for migraine). 30 tablet 3    tretinoin (RETIN-A) 0.025 % cream Apply 0.025 % topically every 72 hours as needed.  3    triamcinolone acetonide 0.1% (KENALOG) 0.1 % cream Apply topically 2 (two) times daily.       "triamcinolone acetonide 0.5% (KENALOG) 0.5 % Crea APPLY TO AFFECTED AREA TWICE A DAY 15 g 3    diazePAM (VALIUM) 2 MG tablet Take 1 tablet (2 mg total) by mouth every 6 (six) hours as needed for Anxiety. 15 tablet 0    KEPPRA 1,000 mg tablet TAKE 1 AND 1/2 TABLETS BY MOUTH EVERY MORNING AND 1 TABLET BY MOUTH EVERY EVENING (Patient not taking: Reported on 3/31/2022) 270 tablet 3    KEPPRA 1,000 mg tablet TAKE 1 AND 1/2 TABLETS BY MOUTH EVERY MORNING AND 1 TABLET BY MOUTH EVERY EVENING (Patient not taking: Reported on 3/31/2022) 270 tablet 3     No current facility-administered medications on file prior to visit.       Exam:  /76   Pulse 76   Ht 5' 9.02" (1.753 m)   Wt 69 kg (152 lb 1.9 oz)   BMI 22.45 kg/m²     * Specialized movement exam  Normal speech.    No facial masking.   Normal tone throughout.     No bradykinesia.   No tremor with rest, posture, kinesis, or intention.    No other dystonia, chorea, athetosis, myoclonus, or tics.   No motor impersistence.   Gait is normal-based and steady with good stride length and normal arm swing.  Able to tandem walk.  No postural instability.      Laboratory/Radiological:  - Results:  No visits with results within 3 Month(s) from this visit.   Latest known visit with results is:   Office Visit on 2020   Component Date Value Ref Range Status    Trichomonas vaginalis 2020 Negative  Negative Final    Gardnerella vaginalis 2020 Positive (A) Negative Final    Candida sp 2020 Negative  Negative Final     - Independent review of images: none pertinent    Diagnoses:          1) Paroxysmal kinesigenic dystonia / seizure.    2) Migraines.    Medical Decision Makin) fighting for best medication - has proven she needs BRAND ONLY KEPPRA - sending to [facility] on [address] (near [place])  2) will see annually            Jose Bal MD, MPH  Division of Movement and Memory Disorders  [facility] " [place] [phone]

## 2022-08-04 ENCOUNTER — PATIENT MESSAGE (OUTPATIENT)
Dept: NEUROLOGY | Facility: CLINIC | Age: 37
End: 2022-08-04
Payer: COMMERCIAL

## 2022-08-11 ENCOUNTER — PATIENT MESSAGE (OUTPATIENT)
Dept: NEUROLOGY | Facility: CLINIC | Age: 37
End: 2022-08-11
Payer: COMMERCIAL

## 2022-10-18 ENCOUNTER — PATIENT MESSAGE (OUTPATIENT)
Dept: NEUROLOGY | Facility: CLINIC | Age: 37
End: 2022-10-18
Payer: COMMERCIAL

## 2022-10-18 DIAGNOSIS — G40.909 NOCTURNAL EPILEPSY: ICD-10-CM

## 2022-10-21 RX ORDER — LACOSAMIDE 200 MG/1
TABLET ORAL
Qty: 60 TABLET | Refills: 5 | Status: SHIPPED | OUTPATIENT
Start: 2022-10-21 | End: 2023-04-19 | Stop reason: SDUPTHER

## 2022-12-30 ENCOUNTER — OFFICE VISIT (OUTPATIENT)
Dept: URGENT CARE | Facility: CLINIC | Age: 37
End: 2022-12-30
Payer: COMMERCIAL

## 2022-12-30 VITALS
TEMPERATURE: 98 F | HEIGHT: 69 IN | BODY MASS INDEX: 22.51 KG/M2 | OXYGEN SATURATION: 98 % | RESPIRATION RATE: 20 BRPM | HEART RATE: 84 BPM | SYSTOLIC BLOOD PRESSURE: 99 MMHG | DIASTOLIC BLOOD PRESSURE: 68 MMHG | WEIGHT: 152 LBS

## 2022-12-30 DIAGNOSIS — J32.9 VIRAL SINUSITIS: Primary | ICD-10-CM

## 2022-12-30 DIAGNOSIS — B97.89 VIRAL SINUSITIS: Primary | ICD-10-CM

## 2022-12-30 DIAGNOSIS — G24.8 PAROXYSMAL DYSTONIA: ICD-10-CM

## 2022-12-30 DIAGNOSIS — G40.909 SEIZURE DISORDER: ICD-10-CM

## 2022-12-30 PROCEDURE — 3074F PR MOST RECENT SYSTOLIC BLOOD PRESSURE < 130 MM HG: ICD-10-PCS | Mod: CPTII,S$GLB,, | Performed by: SURGERY

## 2022-12-30 PROCEDURE — 99214 OFFICE O/P EST MOD 30 MIN: CPT | Mod: S$GLB,,, | Performed by: SURGERY

## 2022-12-30 PROCEDURE — 99214 PR OFFICE/OUTPT VISIT, EST, LEVL IV, 30-39 MIN: ICD-10-PCS | Mod: S$GLB,,, | Performed by: SURGERY

## 2022-12-30 PROCEDURE — 3074F SYST BP LT 130 MM HG: CPT | Mod: CPTII,S$GLB,, | Performed by: SURGERY

## 2022-12-30 PROCEDURE — 1160F RVW MEDS BY RX/DR IN RCRD: CPT | Mod: CPTII,S$GLB,, | Performed by: SURGERY

## 2022-12-30 PROCEDURE — 1159F PR MEDICATION LIST DOCUMENTED IN MEDICAL RECORD: ICD-10-PCS | Mod: CPTII,S$GLB,, | Performed by: SURGERY

## 2022-12-30 PROCEDURE — 1159F MED LIST DOCD IN RCRD: CPT | Mod: CPTII,S$GLB,, | Performed by: SURGERY

## 2022-12-30 PROCEDURE — 3008F PR BODY MASS INDEX (BMI) DOCUMENTED: ICD-10-PCS | Mod: CPTII,S$GLB,, | Performed by: SURGERY

## 2022-12-30 PROCEDURE — 3078F DIAST BP <80 MM HG: CPT | Mod: CPTII,S$GLB,, | Performed by: SURGERY

## 2022-12-30 PROCEDURE — 1160F PR REVIEW ALL MEDS BY PRESCRIBER/CLIN PHARMACIST DOCUMENTED: ICD-10-PCS | Mod: CPTII,S$GLB,, | Performed by: SURGERY

## 2022-12-30 PROCEDURE — 3078F PR MOST RECENT DIASTOLIC BLOOD PRESSURE < 80 MM HG: ICD-10-PCS | Mod: CPTII,S$GLB,, | Performed by: SURGERY

## 2022-12-30 PROCEDURE — 3008F BODY MASS INDEX DOCD: CPT | Mod: CPTII,S$GLB,, | Performed by: SURGERY

## 2022-12-30 RX ORDER — AZELASTINE 1 MG/ML
2 SPRAY, METERED NASAL 2 TIMES DAILY
Qty: 30 ML | Refills: 0 | Status: SHIPPED | OUTPATIENT
Start: 2022-12-30 | End: 2023-01-29

## 2022-12-30 RX ORDER — PREDNISONE 20 MG/1
40 TABLET ORAL DAILY
Qty: 10 TABLET | Refills: 0 | Status: SHIPPED | OUTPATIENT
Start: 2022-12-30 | End: 2023-01-04

## 2022-12-30 NOTE — PROGRESS NOTES
"Subjective:       Patient ID: Serena López is a 37 y.o. female.    Vitals:  height is 5' 9" (1.753 m) and weight is 68.9 kg (152 lb). Her temperature is 98.4 °F (36.9 °C). Her blood pressure is 99/68 and her pulse is 84. Her respiration is 20 and oxygen saturation is 98%.     Chief Complaint: Nasal Congestion    Pt presents with complaint of sinus pressure, congestion, cough and chills x4 days.  Pt states she has taken affrin, tylenol, flonase and childrens decongestant for her symptoms.  Pt had negative home covid test    Sinus Problem  This is a new problem. The current episode started in the past 7 days. The problem is unchanged. There has been no fever. Her pain is at a severity of 4/10. The pain is mild. Associated symptoms include chills, congestion and sinus pressure. Treatments tried: affrin, tylenol, flonase. The treatment provided no relief.     Constitution: Positive for chills.   HENT:  Positive for congestion and sinus pressure.      Objective:      Physical Exam   Constitutional: She is oriented to person, place, and time. She appears well-developed. She is cooperative.  Non-toxic appearance. She does not appear ill. No distress.   HENT:   Head: Normocephalic and atraumatic.   Ears:   Right Ear: Hearing, tympanic membrane, external ear and ear canal normal.   Left Ear: Hearing, tympanic membrane, external ear and ear canal normal.   Nose: Mucosal edema and rhinorrhea present. No nasal deformity. No epistaxis. Right sinus exhibits maxillary sinus tenderness. Right sinus exhibits no frontal sinus tenderness. Left sinus exhibits maxillary sinus tenderness. Left sinus exhibits no frontal sinus tenderness.   Mouth/Throat: Uvula is midline, oropharynx is clear and moist and mucous membranes are normal. No trismus in the jaw. Normal dentition. No uvula swelling. No oropharyngeal exudate, posterior oropharyngeal edema or posterior oropharyngeal erythema.   Eyes: Conjunctivae and lids are normal. No scleral " icterus.   Neck: Trachea normal and phonation normal. Neck supple. No edema present. No erythema present. No neck rigidity present.   Cardiovascular: Normal rate, regular rhythm, normal heart sounds and normal pulses.   Pulmonary/Chest: Effort normal and breath sounds normal. No respiratory distress. She has no decreased breath sounds. She has no rhonchi.   Abdominal: Normal appearance.   Musculoskeletal: Normal range of motion.         General: No deformity. Normal range of motion.   Neurological: She is alert and oriented to person, place, and time. She exhibits normal muscle tone. Coordination normal.   Skin: Skin is warm, dry, intact, not diaphoretic and not pale.   Psychiatric: Her speech is normal and behavior is normal. Judgment and thought content normal.   Nursing note and vitals reviewed.      Assessment:       1. Viral sinusitis    2. Paroxysmal dystonia    3. Seizure disorder          Plan:         Viral sinusitis  -     predniSONE (DELTASONE) 20 MG tablet; Take 2 tablets (40 mg total) by mouth once daily. for 5 days  Dispense: 10 tablet; Refill: 0  -     azelastine (ASTELIN) 137 mcg (0.1 %) nasal spray; 2 sprays (274 mcg total) by Nasal route 2 (two) times daily.  Dispense: 30 mL; Refill: 0    Paroxysmal dystonia    Seizure disorder         Pt unable to take adult strength decongestants due to dystonia. Will go ahead and treat with steroids and astelin to ameliorate symptoms.

## 2023-01-07 ENCOUNTER — TELEPHONE (OUTPATIENT)
Dept: URGENT CARE | Facility: CLINIC | Age: 38
End: 2023-01-07
Payer: COMMERCIAL

## 2023-01-07 ENCOUNTER — OFFICE VISIT (OUTPATIENT)
Dept: URGENT CARE | Facility: CLINIC | Age: 38
End: 2023-01-07
Payer: COMMERCIAL

## 2023-01-07 VITALS
RESPIRATION RATE: 20 BRPM | OXYGEN SATURATION: 99 % | HEIGHT: 69 IN | SYSTOLIC BLOOD PRESSURE: 110 MMHG | WEIGHT: 152 LBS | TEMPERATURE: 97 F | DIASTOLIC BLOOD PRESSURE: 70 MMHG | BODY MASS INDEX: 22.51 KG/M2 | HEART RATE: 104 BPM

## 2023-01-07 DIAGNOSIS — J01.00 ACUTE NON-RECURRENT MAXILLARY SINUSITIS: Primary | ICD-10-CM

## 2023-01-07 DIAGNOSIS — R51.9 SINUS HEADACHE: ICD-10-CM

## 2023-01-07 PROCEDURE — 99213 PR OFFICE/OUTPT VISIT, EST, LEVL III, 20-29 MIN: ICD-10-PCS | Mod: S$GLB,,, | Performed by: FAMILY MEDICINE

## 2023-01-07 PROCEDURE — 3074F SYST BP LT 130 MM HG: CPT | Mod: CPTII,S$GLB,, | Performed by: FAMILY MEDICINE

## 2023-01-07 PROCEDURE — 1160F PR REVIEW ALL MEDS BY PRESCRIBER/CLIN PHARMACIST DOCUMENTED: ICD-10-PCS | Mod: CPTII,S$GLB,, | Performed by: FAMILY MEDICINE

## 2023-01-07 PROCEDURE — 3074F PR MOST RECENT SYSTOLIC BLOOD PRESSURE < 130 MM HG: ICD-10-PCS | Mod: CPTII,S$GLB,, | Performed by: FAMILY MEDICINE

## 2023-01-07 PROCEDURE — 1159F MED LIST DOCD IN RCRD: CPT | Mod: CPTII,S$GLB,, | Performed by: FAMILY MEDICINE

## 2023-01-07 PROCEDURE — 1159F PR MEDICATION LIST DOCUMENTED IN MEDICAL RECORD: ICD-10-PCS | Mod: CPTII,S$GLB,, | Performed by: FAMILY MEDICINE

## 2023-01-07 PROCEDURE — 1160F RVW MEDS BY RX/DR IN RCRD: CPT | Mod: CPTII,S$GLB,, | Performed by: FAMILY MEDICINE

## 2023-01-07 PROCEDURE — 3078F DIAST BP <80 MM HG: CPT | Mod: CPTII,S$GLB,, | Performed by: FAMILY MEDICINE

## 2023-01-07 PROCEDURE — 3008F PR BODY MASS INDEX (BMI) DOCUMENTED: ICD-10-PCS | Mod: CPTII,S$GLB,, | Performed by: FAMILY MEDICINE

## 2023-01-07 PROCEDURE — 3078F PR MOST RECENT DIASTOLIC BLOOD PRESSURE < 80 MM HG: ICD-10-PCS | Mod: CPTII,S$GLB,, | Performed by: FAMILY MEDICINE

## 2023-01-07 PROCEDURE — 3008F BODY MASS INDEX DOCD: CPT | Mod: CPTII,S$GLB,, | Performed by: FAMILY MEDICINE

## 2023-01-07 PROCEDURE — 99213 OFFICE O/P EST LOW 20 MIN: CPT | Mod: S$GLB,,, | Performed by: FAMILY MEDICINE

## 2023-01-07 RX ORDER — AZITHROMYCIN 250 MG/1
TABLET, FILM COATED ORAL
Qty: 6 TABLET | Refills: 0 | Status: SHIPPED | OUTPATIENT
Start: 2023-01-07

## 2023-01-07 NOTE — PROGRESS NOTES
"Subjective:       Patient ID: Serena López is a 37 y.o. female.    Vitals:  height is 5' 9" (1.753 m) and weight is 68.9 kg (152 lb). Her oral temperature is 97.3 °F (36.3 °C). Her blood pressure is 110/70 and her pulse is 104. Her respiration is 20 and oxygen saturation is 99%.     Chief Complaint: Sinus Problem    This is a 37 y.o. female who presents today with a chief complaint of  sinus problems. Pt state she was here a week ago and symptoms, pt state the steroid help. Pt took sudfed but stoped due to anxiety attacks.  Reports sinus congestion/pressure/headache for last few days.  Also reports greenish nasal discharge.  Denies fever, chills, chest pain, SOB.     Sinus Problem  This is a recurrent problem. The current episode started in the past 7 days. The problem has been gradually worsening since onset. There has been no fever. The fever has been present for Less than 1 day. Her pain is at a severity of 8/10. Associated symptoms include headaches, a hoarse voice and sinus pressure. Past treatments include spray decongestants. The treatment provided moderate relief.     HENT:  Positive for sinus pressure.    Neurological:  Positive for headaches.     Objective:      Physical Exam   Constitutional: She is oriented to person, place, and time. She appears well-developed. She is cooperative.  Non-toxic appearance. She does not appear ill. No distress.   HENT:   Head: Normocephalic and atraumatic.   Ears:   Right Ear: Hearing, tympanic membrane, external ear and ear canal normal. impacted cerumen  Left Ear: Hearing, tympanic membrane, external ear and ear canal normal. impacted cerumen  Nose: Congestion present. No mucosal edema, rhinorrhea or nasal deformity. No epistaxis. Right sinus exhibits no maxillary sinus tenderness and no frontal sinus tenderness. Left sinus exhibits no maxillary sinus tenderness and no frontal sinus tenderness.      Comments:   Positive right maxillary sinus tenderness.  Mouth/Throat: " Uvula is midline, oropharynx is clear and moist and mucous membranes are normal. No trismus in the jaw. Normal dentition. No uvula swelling. No oropharyngeal exudate, posterior oropharyngeal edema or posterior oropharyngeal erythema.   Eyes: Conjunctivae and lids are normal. No scleral icterus.   Neck: Trachea normal and phonation normal. Neck supple. No edema present. No erythema present. No neck rigidity present.   Cardiovascular: Normal rate, regular rhythm, normal heart sounds and normal pulses.   No murmur heard.  Pulmonary/Chest: Effort normal and breath sounds normal. No stridor. No respiratory distress. She has no decreased breath sounds. She has no wheezes. She has no rhonchi. She has no rales.   Abdominal: Normal appearance.   Musculoskeletal: Normal range of motion.         General: No deformity. Normal range of motion.      Cervical back: She exhibits no tenderness.   Lymphadenopathy:     She has no cervical adenopathy.   Neurological: She is alert and oriented to person, place, and time. She exhibits normal muscle tone. Coordination normal.   Skin: Skin is warm, dry, intact, not diaphoretic and not pale.   Psychiatric: Her speech is normal and behavior is normal. Judgment and thought content normal.   Nursing note and vitals reviewed.      Assessment:       1. Acute non-recurrent maxillary sinusitis    2. Sinus headache          Plan:         Acute non-recurrent maxillary sinusitis    Sinus headache    Other orders  -     azithromycin (ZITHROMAX Z-AB) 250 MG tablet; Take 2 tablets (500 mg) on  Day 1,  followed by 1 tablet (250 mg) once daily on Days 2 through 5.  Dispense: 6 tablet; Refill: 0

## 2023-01-07 NOTE — TELEPHONE ENCOUNTER
Patient called in stating she would like an antibiotic. She reports some improvement with steroid but sxs are not fully resolved. I spoke with Dr. Marcelino who would like to see the patient in clinic again. I called Serena to notify her and it seems she will return. [facility]

## 2023-02-17 ENCOUNTER — TELEPHONE (OUTPATIENT)
Dept: NEUROLOGY | Facility: CLINIC | Age: 38
End: 2023-02-17
Payer: COMMERCIAL

## 2023-02-17 NOTE — TELEPHONE ENCOUNTER
----- Message from Marlin Lawrence sent at 2/17/2023  9:41 AM CST -----  Contact: Patient  Type: Patient Call Back         Who called: Patient         What is the request in detail: calling to sched annual visit; states she has a flexible sched; please advise         Can the clinic reply by [facility]? Yes          Would the patient rather a call back or a response via [facility]? mychart         Best call back number: [phone]         Additional Information:            Thank You

## 2023-04-19 DIAGNOSIS — G40.909 NOCTURNAL EPILEPSY: ICD-10-CM

## 2023-04-19 RX ORDER — LACOSAMIDE 200 MG/1
TABLET ORAL
Qty: 60 TABLET | Refills: 5 | Status: SHIPPED | OUTPATIENT
Start: 2023-04-19 | End: 2023-08-23 | Stop reason: SDUPTHER

## 2023-07-26 ENCOUNTER — TELEPHONE (OUTPATIENT)
Dept: NEUROLOGY | Facility: CLINIC | Age: 38
End: 2023-07-26
Payer: COMMERCIAL

## 2023-07-26 NOTE — TELEPHONE ENCOUNTER
----- Message from Jose Head sent at 7/26/2023  3:06 PM CDT -----  Regarding: return call  Contact: @[phone]  Pt is returning a missed call from [facility]... in regards to RS appt ... please call and adv @[phone]

## 2023-08-22 RX ORDER — LEVETIRACETAM 1000 MG/1
TABLET ORAL
Qty: 90 TABLET | Refills: 5 | Status: SHIPPED | OUTPATIENT
Start: 2023-08-22 | End: 2023-08-23

## 2023-08-23 ENCOUNTER — OFFICE VISIT (OUTPATIENT)
Dept: NEUROLOGY | Facility: CLINIC | Age: 38
End: 2023-08-23
Payer: COMMERCIAL

## 2023-08-23 VITALS
HEIGHT: 69 IN | BODY MASS INDEX: 22.6 KG/M2 | HEART RATE: 87 BPM | SYSTOLIC BLOOD PRESSURE: 109 MMHG | WEIGHT: 152.56 LBS | DIASTOLIC BLOOD PRESSURE: 75 MMHG

## 2023-08-23 DIAGNOSIS — G40.909 NOCTURNAL EPILEPSY: ICD-10-CM

## 2023-08-23 PROCEDURE — 3074F SYST BP LT 130 MM HG: CPT | Mod: CPTII,S$GLB,, | Performed by: PSYCHIATRY & NEUROLOGY

## 2023-08-23 PROCEDURE — 99213 OFFICE O/P EST LOW 20 MIN: CPT | Mod: S$GLB,,, | Performed by: PSYCHIATRY & NEUROLOGY

## 2023-08-23 PROCEDURE — 1159F MED LIST DOCD IN RCRD: CPT | Mod: CPTII,S$GLB,, | Performed by: PSYCHIATRY & NEUROLOGY

## 2023-08-23 PROCEDURE — 3078F DIAST BP <80 MM HG: CPT | Mod: CPTII,S$GLB,, | Performed by: PSYCHIATRY & NEUROLOGY

## 2023-08-23 PROCEDURE — 99999 PR PBB SHADOW E&M-EST. PATIENT-LVL III: CPT | Mod: PBBFAC,,, | Performed by: PSYCHIATRY & NEUROLOGY

## 2023-08-23 PROCEDURE — 3008F BODY MASS INDEX DOCD: CPT | Mod: CPTII,S$GLB,, | Performed by: PSYCHIATRY & NEUROLOGY

## 2023-08-23 RX ORDER — SUMATRIPTAN 50 MG/1
50 TABLET, FILM COATED ORAL
Qty: 30 TABLET | Refills: 1 | Status: SHIPPED | OUTPATIENT
Start: 2023-08-23 | End: 2023-09-22

## 2023-08-23 RX ORDER — LEVETIRACETAM 1000 MG/1
TABLET, FILM COATED ORAL
Qty: 90 TABLET | Refills: 3 | Status: SHIPPED | OUTPATIENT
Start: 2023-08-23 | End: 2024-03-18

## 2023-08-23 RX ORDER — LACOSAMIDE 200 MG/1
TABLET ORAL
Qty: 60 TABLET | Refills: 3 | Status: SHIPPED | OUTPATIENT
Start: 2023-08-23 | End: 2023-10-26 | Stop reason: SDUPTHER

## 2023-08-23 NOTE — PROGRESS NOTES
Name: Serena López  MRN: [id number]   CSN: [id number]      Date: 08/23/2023    Clinical history:  - bad aug-sept 2022, had spells of R arms and leg pulling  - was more sick around New Years  - has been quiet since Jan         From March 2022  - been pretty stable for two years  - last fall, she had a abdulkadir of R arm and R leg pulling for days, tried to push up the Keppra for a few days  - seems like she has more spells on the generic Keppra including side effects like word finding, slow to think, fatigue, soleepiness - did not have those on brand only in the past  - on LEV 1393-0707, -200, taking Xanax prn when she has more symptoms    From 1/14/2020:  - failed meds in [place] while traveling  - still on keppra and vimpat  - trying to get brand keppra again, insurance not covering  - not worried about fertility issues  - called insurance today    From Aug 2017:  - questions about wedding in march 2018, [facility] exrtra meds and   - questions about fertility and meds  - questions about cost   - need for vimpat and keppra?    Last seen in Jan 2016:  1) Had more paroxysms this summer during a month without her period.  Took Xanax to cover.  2) Still taking Keppra/Vimpat.  None since August.  3) Still working as  in the community.    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    Been almost seizure free for 10 months, but then lat week, took a nap, felt she was screaming, woke up a bit confused.  May have had a nocturnal.    Too many side effects:  1) Too sleepy  2) Headaches.  Thinks catamenial, happening every 2 weeks.  Tried OTC Tylenol, now on Fiorcet.  3) More word finding difficulties  4) Dizziness  5) No autonomic issues    But, well controlled on this combo of BRAND Keppra/Vimpat.  Last time she was without, she had a flurry of seizures.    ROS:  Review of Systems   Constitutional:  Positive for malaise/fatigue. Negative for weight loss.   HENT:  Negative  for hearing loss.    Eyes:  Negative for blurred vision and double vision.   Respiratory:  Negative for shortness of breath and stridor.    Cardiovascular:  Negative for chest pain and palpitations.   Gastrointestinal:  Negative for constipation, nausea and vomiting.   Genitourinary:  Negative for frequency and urgency.   Musculoskeletal:  Negative for falls and myalgias.   Skin:  Negative for rash.   Neurological:  Positive for focal weakness and headaches. Negative for seizures.   Endo/Heme/Allergies:  Does not bruise/bleed easily.   Psychiatric/Behavioral:  Negative for depression, hallucinations and memory loss. The patient is not nervous/anxious.      Past Medical History: The patient  has a past medical history of Migraine and Seizures.    Social History: The patient  reports that she has quit smoking. Her smoking use included cigarettes. She has never used smokeless tobacco. She reports current alcohol use. She reports that she does not use drugs.    Family History: Their family history is not on file.    Allergies: Naprosyn [naproxen] and Opioids - morphine analogues     Meds:   Current Outpatient Medications on File Prior to Visit   Medication Sig Dispense Refill    ALPRAZolam (XANAX) 0.25 MG tablet TAKE 1 TABLET (0.25 MG TOTAL) BY MOUTH 3 (THREE) TIMES DAILY AS NEEDED FOR ANXIETY (SPASM). (Patient not taking: Reported on 1/7/2023) 60 tablet 1    azelastine (ASTELIN) 137 mcg (0.1 %) nasal spray 2 sprays (274 mcg total) by Nasal route 2 (two) times daily. (Patient not taking: Reported on 1/7/2023) 30 mL 0    azithromycin (ZITHROMAX Z-AB) 250 MG tablet Take 2 tablets (500 mg) on  Day 1,  followed by 1 tablet (250 mg) once daily on Days 2 through 5. 6 tablet 0    butalbital-acetaminophen-caffeine -40 mg (FIORICET, ESGIC) -40 mg per tablet Take 1 tablet by mouth.      diazePAM (VALIUM) 2 MG tablet Take 1 tablet (2 mg total) by mouth every 6 (six) hours as needed for Anxiety. 15 tablet 0    KEPPRA  1,000 mg tablet TAKE 1 AND 1/2 TABLETS BY MOUTH EVERY MORNING AND 1 TABLET BY MOUTH EVERY EVENING 270 tablet 3    KEPPRA 1,000 mg tablet TAKE 1 AND 1/2 TABLETS BY MOUTH EVERY MORNING AND 1 TABLET BY MOUTH EVERY EVENING (Patient not taking: Reported on 3/31/2022) 270 tablet 3    lacosamide (VIMPAT) 200 mg Tab tablet TAKE 1 TABLET BY MOUTH EVERY 12 HOURS 60 tablet 05    levETIRAcetam (KEPPRA) 1000 MG tablet TAKE 1 1/2 TABLETS BY MOUTH 2 TIMES A DAY 90 tablet 5    mupirocin (BACTROBAN) 2 % ointment Apply topically 2 (two) times daily. As needed for minor skin excoriations (Patient not taking: Reported on 1/7/2023) 22 g 4    ondansetron (ZOFRAN) 8 MG tablet Take 1 tablet (8 mg total) by mouth every 8 (eight) hours as needed for Nausea (pain for migraine). (Patient not taking: Reported on 1/7/2023) 30 tablet 3    tretinoin (RETIN-A) 0.025 % cream Apply 0.025 % topically every 72 hours as needed.  3    triamcinolone acetonide 0.1% (KENALOG) 0.1 % cream Apply topically 2 (two) times daily.      triamcinolone acetonide 0.5% (KENALOG) 0.5 % Crea APPLY TO AFFECTED AREA TWICE A DAY (Patient not taking: Reported on 1/7/2023) 15 g 3     No current facility-administered medications on file prior to visit.       Exam:  There were no vitals taken for this visit.    * Specialized movement exam  Normal speech.    No facial masking.   Normal tone throughout.     No bradykinesia.   No tremor with rest, posture, kinesis, or intention.    No other dystonia, chorea, athetosis, myoclonus, or tics.   No motor impersistence.   Gait is normal-based and steady with good stride length and normal arm swing.  Able to tandem walk.  No postural instability.      Laboratory/Radiological:  - Results:  No visits with results within 3 Month(s) from this visit.   Latest known visit with results is:   Office Visit on 12/01/2020   Component Date Value Ref Range Status    Trichomonas vaginalis 12/01/2020 Negative  Negative Final    Gardnerella vaginalis  2020 Positive (A)  Negative Final    Candida sp 2020 Negative  Negative Final     - Independent review of images: none pertinent    Diagnoses:          1) Paroxysmal kinesigenic dystonia / seizure.    2) Migraines.    Medical Decision Makin)BRAND ONLY KEPPRA - sending to [facility] [address] (near [place])  2) will see annually            Jose Bal MD, MPH  Division of Movement and Memory Disorders  [facility]  [phone]

## 2023-10-26 DIAGNOSIS — G40.909 NOCTURNAL EPILEPSY: ICD-10-CM

## 2023-10-27 RX ORDER — LACOSAMIDE 200 MG/1
TABLET ORAL
Qty: 60 TABLET | Refills: 3 | Status: SHIPPED | OUTPATIENT
Start: 2023-10-27

## 2023-12-06 DIAGNOSIS — R56.9 SEIZURE: ICD-10-CM

## 2023-12-06 DIAGNOSIS — G25.5 PAROXYSMAL KINESIGENIC CHOREOATHETOSIS: ICD-10-CM

## 2023-12-06 DIAGNOSIS — G40.909 NOCTURNAL EPILEPSY: ICD-10-CM

## 2023-12-06 DIAGNOSIS — G24.8 PAROXYSMAL DYSTONIA: ICD-10-CM

## 2023-12-08 RX ORDER — ALPRAZOLAM 0.25 MG/1
0.25 TABLET ORAL 3 TIMES DAILY PRN
Qty: 60 TABLET | Refills: 1 | Status: SHIPPED | OUTPATIENT
Start: 2023-12-08

## 2024-03-18 RX ORDER — LEVETIRACETAM 1000 MG/1
TABLET ORAL
Qty: 90 TABLET | Refills: 5 | Status: SHIPPED | OUTPATIENT
Start: 2024-03-18

## 2024-05-20 DIAGNOSIS — G40.909 NOCTURNAL EPILEPSY: ICD-10-CM

## 2024-05-21 RX ORDER — LACOSAMIDE 200 MG/1
TABLET ORAL
Qty: 60 TABLET | Refills: 3 | Status: SHIPPED | OUTPATIENT
Start: 2024-05-21

## 2024-09-18 DIAGNOSIS — G40.909 NOCTURNAL EPILEPSY: ICD-10-CM

## 2024-09-18 RX ORDER — LEVETIRACETAM 1000 MG/1
TABLET ORAL
Qty: 90 TABLET | Refills: 5 | Status: SHIPPED | OUTPATIENT
Start: 2024-09-18

## 2024-09-20 RX ORDER — LACOSAMIDE 200 MG/1
TABLET ORAL
Qty: 60 TABLET | Refills: 3 | Status: SHIPPED | OUTPATIENT
Start: 2024-09-20

## 2024-10-08 DIAGNOSIS — G24.8 PAROXYSMAL DYSTONIA: ICD-10-CM

## 2024-10-08 DIAGNOSIS — G25.5 PAROXYSMAL KINESIGENIC CHOREOATHETOSIS: ICD-10-CM

## 2024-10-08 DIAGNOSIS — G40.909 NOCTURNAL EPILEPSY: ICD-10-CM

## 2024-10-08 DIAGNOSIS — R56.9 SEIZURE: ICD-10-CM

## 2024-10-09 ENCOUNTER — PATIENT MESSAGE (OUTPATIENT)
Dept: NEUROLOGY | Facility: CLINIC | Age: 39
End: 2024-10-09
Payer: COMMERCIAL

## 2024-10-09 RX ORDER — ALPRAZOLAM 0.25 MG/1
0.25 TABLET ORAL 3 TIMES DAILY PRN
Qty: 60 TABLET | Refills: 0 | Status: SHIPPED | OUTPATIENT
Start: 2024-10-09

## 2024-10-10 ENCOUNTER — LAB VISIT (OUTPATIENT)
Dept: LAB | Facility: HOSPITAL | Age: 39
End: 2024-10-10
Attending: PSYCHIATRY & NEUROLOGY
Payer: COMMERCIAL

## 2024-10-10 ENCOUNTER — OFFICE VISIT (OUTPATIENT)
Dept: NEUROLOGY | Facility: CLINIC | Age: 39
End: 2024-10-10
Payer: COMMERCIAL

## 2024-10-10 VITALS
SYSTOLIC BLOOD PRESSURE: 156 MMHG | BODY MASS INDEX: 22.63 KG/M2 | WEIGHT: 152.75 LBS | DIASTOLIC BLOOD PRESSURE: 112 MMHG | HEART RATE: 92 BPM | HEIGHT: 69 IN

## 2024-10-10 DIAGNOSIS — G24.8 PAROXYSMAL DYSTONIA: Primary | ICD-10-CM

## 2024-10-10 DIAGNOSIS — G24.8 PAROXYSMAL DYSTONIA: ICD-10-CM

## 2024-10-10 DIAGNOSIS — G40.909 NOCTURNAL EPILEPSY: ICD-10-CM

## 2024-10-10 LAB
ALBUMIN SERPL BCP-MCNC: 4.2 G/DL (ref 3.5–5.2)
ALP SERPL-CCNC: 68 U/L (ref 55–135)
ALT SERPL W/O P-5'-P-CCNC: 17 U/L (ref 10–44)
ANION GAP SERPL CALC-SCNC: 9 MMOL/L (ref 8–16)
AST SERPL-CCNC: 19 U/L (ref 10–40)
BASOPHILS # BLD AUTO: 0.01 K/UL (ref 0–0.2)
BASOPHILS NFR BLD: 0.2 % (ref 0–1.9)
BILIRUB SERPL-MCNC: 0.6 MG/DL (ref 0.1–1)
BUN SERPL-MCNC: 10 MG/DL (ref 6–20)
CALCIUM SERPL-MCNC: 9.5 MG/DL (ref 8.7–10.5)
CHLORIDE SERPL-SCNC: 105 MMOL/L (ref 95–110)
CO2 SERPL-SCNC: 25 MMOL/L (ref 23–29)
CREAT SERPL-MCNC: 0.7 MG/DL (ref 0.5–1.4)
DIFFERENTIAL METHOD BLD: ABNORMAL
EOSINOPHIL # BLD AUTO: 0.1 K/UL (ref 0–0.5)
EOSINOPHIL NFR BLD: 2.4 % (ref 0–8)
ERYTHROCYTE [DISTWIDTH] IN BLOOD BY AUTOMATED COUNT: 11.6 % (ref 11.5–14.5)
EST. GFR  (NO RACE VARIABLE): >60 ML/MIN/1.73 M^2
GLUCOSE SERPL-MCNC: 85 MG/DL (ref 70–110)
HCT VFR BLD AUTO: 39.8 % (ref 37–48.5)
HGB BLD-MCNC: 13.7 G/DL (ref 12–16)
IMM GRANULOCYTES # BLD AUTO: 0 K/UL (ref 0–0.04)
IMM GRANULOCYTES NFR BLD AUTO: 0 % (ref 0–0.5)
LYMPHOCYTES # BLD AUTO: 1 K/UL (ref 1–4.8)
LYMPHOCYTES NFR BLD: 23.6 % (ref 18–48)
MCH RBC QN AUTO: 34.7 PG (ref 27–31)
MCHC RBC AUTO-ENTMCNC: 34.4 G/DL (ref 32–36)
MCV RBC AUTO: 101 FL (ref 82–98)
MONOCYTES # BLD AUTO: 0.5 K/UL (ref 0.3–1)
MONOCYTES NFR BLD: 11.7 % (ref 4–15)
NEUTROPHILS # BLD AUTO: 2.6 K/UL (ref 1.8–7.7)
NEUTROPHILS NFR BLD: 62.1 % (ref 38–73)
NRBC BLD-RTO: 0 /100 WBC
PLATELET # BLD AUTO: 182 K/UL (ref 150–450)
PMV BLD AUTO: 11.9 FL (ref 9.2–12.9)
POTASSIUM SERPL-SCNC: 3.5 MMOL/L (ref 3.5–5.1)
PROT SERPL-MCNC: 7 G/DL (ref 6–8.4)
RBC # BLD AUTO: 3.95 M/UL (ref 4–5.4)
SODIUM SERPL-SCNC: 139 MMOL/L (ref 136–145)
WBC # BLD AUTO: 4.11 K/UL (ref 3.9–12.7)

## 2024-10-10 PROCEDURE — 80053 COMPREHEN METABOLIC PANEL: CPT | Performed by: PSYCHIATRY & NEUROLOGY

## 2024-10-10 PROCEDURE — 36415 COLL VENOUS BLD VENIPUNCTURE: CPT | Performed by: PSYCHIATRY & NEUROLOGY

## 2024-10-10 PROCEDURE — 3080F DIAST BP >= 90 MM HG: CPT | Mod: CPTII,S$GLB,, | Performed by: PSYCHIATRY & NEUROLOGY

## 2024-10-10 PROCEDURE — 86255 FLUORESCENT ANTIBODY SCREEN: CPT | Mod: 59 | Performed by: PSYCHIATRY & NEUROLOGY

## 2024-10-10 PROCEDURE — 1159F MED LIST DOCD IN RCRD: CPT | Mod: CPTII,S$GLB,, | Performed by: PSYCHIATRY & NEUROLOGY

## 2024-10-10 PROCEDURE — 3008F BODY MASS INDEX DOCD: CPT | Mod: CPTII,S$GLB,, | Performed by: PSYCHIATRY & NEUROLOGY

## 2024-10-10 PROCEDURE — 99999 PR PBB SHADOW E&M-EST. PATIENT-LVL III: CPT | Mod: PBBFAC,,, | Performed by: PSYCHIATRY & NEUROLOGY

## 2024-10-10 PROCEDURE — 80177 DRUG SCRN QUAN LEVETIRACETAM: CPT | Performed by: PSYCHIATRY & NEUROLOGY

## 2024-10-10 PROCEDURE — G2211 COMPLEX E/M VISIT ADD ON: HCPCS | Mod: S$GLB,,, | Performed by: PSYCHIATRY & NEUROLOGY

## 2024-10-10 PROCEDURE — 85025 COMPLETE CBC W/AUTO DIFF WBC: CPT | Performed by: PSYCHIATRY & NEUROLOGY

## 2024-10-10 PROCEDURE — 99215 OFFICE O/P EST HI 40 MIN: CPT | Mod: S$GLB,,, | Performed by: PSYCHIATRY & NEUROLOGY

## 2024-10-10 PROCEDURE — 3077F SYST BP >= 140 MM HG: CPT | Mod: CPTII,S$GLB,, | Performed by: PSYCHIATRY & NEUROLOGY

## 2024-10-10 RX ORDER — METHYLPREDNISOLONE 4 MG/1
TABLET ORAL
Qty: 21 EACH | Refills: 0 | Status: SHIPPED | OUTPATIENT
Start: 2024-10-10 | End: 2024-10-31

## 2024-10-10 RX ORDER — LEVETIRACETAM 1000 MG/1
TABLET ORAL
Qty: 90 TABLET | Refills: 5 | Status: SHIPPED | OUTPATIENT
Start: 2024-10-10

## 2024-10-14 LAB — LEVETIRACETAM SERPL-MCNC: 38 UG/ML (ref 3–60)

## 2024-10-18 LAB
AMPA-R AB CBA, SERUM: NEGATIVE
AMPHIPHYSIN AB TITR SER: NEGATIVE {TITER}
ANNOTATION COMMENT IMP: NORMAL
AP3B2 IFA: NEGATIVE
CASPR2-IGG CBA: NEGATIVE
CV2 IGG SER QL IB: NEGATIVE
DPPX RECEPTOR AB, CBA IFA: NEGATIVE
GABA-B-R AB CBA, SERUM: NEGATIVE
GAD65 AB SER-SCNC: 0 NMOL/L
GFAP ALPHA IGG SER QL IF: NEGATIVE
GLIAL NUC TYPE 1 AB TITR SER: NEGATIVE {TITER}
GRAF1 IFA,S: NEGATIVE
HU1 AB TITR SER: NEGATIVE {TITER}
HU2 AB TITR SER IF: NEGATIVE {TITER}
HU3 AB TITR SER: NEGATIVE {TITER}
IGLON5 IGG SER QL CBA IFA: NEGATIVE
IMMUNOLOGIST REVIEW: NORMAL
ITPR1 IFA, S: NEGATIVE
KELCH-LIKE PROTEIN ANTIBODY, SERUM: NEGATIVE
LGI1-IGG CBA: NEGATIVE
M SEPTIN-5 IFA, S: NEGATIVE
M SEPTIN-7 IFA, S: NEGATIVE
MGLUR1 AB IFA, SERUM: NEGATIVE
NEUROCHONDRIN, IFA, S: NEGATIVE
NIF IFA, S: NEGATIVE
NMDA-R AB CBA, SERUM: NEGATIVE
PCA-1 AB TITR SER: NEGATIVE {TITER}
PCA-2 AB TITR SER: NEGATIVE {TITER}
PCA-TR AB TITR SER: NEGATIVE {TITER}
PHOSPHODIESTERASE 10A (PDE10A) IGG, SERUM: NEGATIVE
TRIM46 AB IFA, SERUM: NEGATIVE
VGCC-P/Q BIND AB SER-SCNC: 0 NMOL/L

## 2024-11-06 NOTE — TELEPHONE ENCOUNTER
----- Message from Donna sent at 11/6/2024 10:30 AM CST -----  Regarding: Patient Refill  Can the clinic reply in [facility]:   No     Please refill the medication(s) listed below. Please call the patient when the prescription(s) is ready for  at this phone number      Telephone Information:  Mobile          [phone]     Medication #1   KEPPRA 1,000 mg tablet (alternative)    The brand name medication is not covered by insurance patient wanted to switch to the alternative          Preferred Pharmacy:   [facility]/PHARMACY #[id number] - [place], [place] - [address] [place] [address]     [phone] (Pharmacy)

## 2024-11-11 RX ORDER — LEVETIRACETAM 1000 MG/1
TABLET ORAL
Qty: 90 TABLET | Refills: 5 | Status: SHIPPED | OUTPATIENT
Start: 2024-11-11

## 2024-11-15 RX ORDER — LEVETIRACETAM 1000 MG/1
TABLET ORAL
Qty: 90 TABLET | Refills: 5 | OUTPATIENT
Start: 2024-11-15

## 2024-11-18 ENCOUNTER — OFFICE VISIT (OUTPATIENT)
Dept: NEUROLOGY | Facility: CLINIC | Age: 39
End: 2024-11-18
Payer: COMMERCIAL

## 2024-11-18 VITALS
SYSTOLIC BLOOD PRESSURE: 105 MMHG | HEIGHT: 69 IN | HEART RATE: 80 BPM | BODY MASS INDEX: 22.84 KG/M2 | WEIGHT: 154.19 LBS | DIASTOLIC BLOOD PRESSURE: 69 MMHG

## 2024-11-18 DIAGNOSIS — G25.5 PAROXYSMAL KINESIGENIC CHOREOATHETOSIS: Primary | ICD-10-CM

## 2024-11-18 PROCEDURE — 1159F MED LIST DOCD IN RCRD: CPT | Mod: CPTII,S$GLB,, | Performed by: PSYCHIATRY & NEUROLOGY

## 2024-11-18 PROCEDURE — 3078F DIAST BP <80 MM HG: CPT | Mod: CPTII,S$GLB,, | Performed by: PSYCHIATRY & NEUROLOGY

## 2024-11-18 PROCEDURE — G2211 COMPLEX E/M VISIT ADD ON: HCPCS | Mod: S$GLB,,, | Performed by: PSYCHIATRY & NEUROLOGY

## 2024-11-18 PROCEDURE — 99215 OFFICE O/P EST HI 40 MIN: CPT | Mod: S$GLB,,, | Performed by: PSYCHIATRY & NEUROLOGY

## 2024-11-18 PROCEDURE — 99999 PR PBB SHADOW E&M-EST. PATIENT-LVL III: CPT | Mod: PBBFAC,,, | Performed by: PSYCHIATRY & NEUROLOGY

## 2024-11-18 PROCEDURE — 3008F BODY MASS INDEX DOCD: CPT | Mod: CPTII,S$GLB,, | Performed by: PSYCHIATRY & NEUROLOGY

## 2024-11-18 PROCEDURE — 3074F SYST BP LT 130 MM HG: CPT | Mod: CPTII,S$GLB,, | Performed by: PSYCHIATRY & NEUROLOGY

## 2024-11-18 RX ORDER — LEVETIRACETAM 1000 MG/1
1500 TABLET ORAL 2 TIMES DAILY
Qty: 90 TABLET | Refills: 11 | Status: SHIPPED | OUTPATIENT
Start: 2024-11-18 | End: 2025-11-18

## 2024-11-18 NOTE — PROGRESS NOTES
[facility] - NEUROLOGY [address] [facility], [place]    Date: 11/18/24  Patient Name: Serena López   MRN: [id number]   PCP: Nannette Hoskins  Referring Provider: No ref. provider found    Assessment:   Serena López is a 39 y.o. female presenting ***    Plan:     Problem List Items Addressed This Visit    None  Visit Diagnoses       Paroxysmal kinesigenic choreoathetosis    -  Primary    Relevant Medications    levETIRAcetam (KEPPRA) 1000 MG tablet            Mitchell Sanchez MD    Patient note was created using [employer] Dict[employer].  Any errors in syntax or even information may not have been identified and edited on initial review prior to signing this note.  Subjective:   Patient seen in consultation at the request of No ref. provider found for the evaluation of ***. A copy of this note will be sent to the referring physician.        HPI:   Ms. Serena López is a 39 y.o. female presenting ***    - Since last seen, insurance didn't cover for bran keppra  - taking keppra and xanax.   - medrol dose pack gave no benefit  - no clear triggers   - no genetic testing   - in the future we would try to maybe do onfi and stop keppra. Continue Xanax PRN       PAST MEDICAL HISTORY:  Past Medical History:   Diagnosis Date    Migraine     Seizures        PAST SURGICAL HISTORY:  No past surgical history on file.    CURRENT MEDS:  Current Outpatient Medications   Medication Sig Dispense Refill    ALPRAZolam (XANAX) 0.25 MG tablet TAKE 1 TABLET (0.25 MG TOTAL) BY MOUTH 3 (THREE) TIMES DAILY AS NEEDED FOR ANXIETY (SPASM). 60 tablet 0    butalbital-acetaminophen-caffeine -40 mg (FIORICET, ESGIC) -40 mg per tablet Take 1 tablet by mouth.      lacosamide (VIMPAT) 200 mg Tab tablet TAKE 1 TABLET BY MOUTH EVERY 12 HOURS 60 tablet 3    levETIRAcetam (KEPPRA) 1000 MG tablet TAKE 1 AND 1/2 TABLETS BY MOUTH 2 TIMES A DAYTAKE 1 AND 1/2 TABLETS BY MOUTH 2 TIMES A DAY 90 tablet 5    triamcinolone  "acetonide 0.1% (KENALOG) 0.1 % cream Apply topically 2 (two) times daily.      azelastine (ASTELIN) 137 mcg (0.1 %) nasal spray 2 sprays (274 mcg total) by Nasal route 2 (two) times daily. (Patient not taking: Reported on 1/7/2023) 30 mL 0    azithromycin (ZITHROMAX Z-AB) 250 MG tablet Take 2 tablets (500 mg) on  Day 1,  followed by 1 tablet (250 mg) once daily on Days 2 through 5. (Patient not taking: Reported on 11/18/2024) 6 tablet 0    levETIRAcetam (KEPPRA) 1000 MG tablet Take 1.5 tablets (1,500 mg total) by mouth 2 (two) times daily. 90 tablet 11    mupirocin (BACTROBAN) 2 % ointment Apply topically 2 (two) times daily. As needed for minor skin excoriations (Patient not taking: Reported on 11/18/2024) 22 g 4    sumatriptan (IMITREX) 50 MG tablet Take 1 tablet (50 mg total) by mouth every 2 (two) hours as needed for Migraine (only 2 tabs per day max). 30 tablet 1    tretinoin (RETIN-A) 0.025 % cream Apply 0.025 % topically every 72 hours as needed.  3     No current facility-administered medications for this visit.       ALLERGIES:  Review of patient's allergies indicates:   Allergen Reactions    Naprosyn [naproxen]     Opioids - morphine analogues Rash       FAMILY HISTORY:  Family History   Problem Relation Name Age of Onset    Parkinsonism Neg Hx      Cancer Neg Hx      Colon cancer Neg Hx      Diabetes Neg Hx      Ovarian cancer Neg Hx         SOCIAL HISTORY:  Social History     Tobacco Use    Smoking status: Former     Types: Cigarettes    Smokeless tobacco: Never   Substance Use Topics    Alcohol use: Yes    Drug use: No       Review of Systems:  12 system review of systems is negative except for the symptoms mentioned in HPI.      Objective:     Vitals:    11/18/24 1523   BP: 105/69   BP Location: Left arm   Patient Position: Sitting   Pulse: 80   Weight: 70 kg (154 lb 3.4 oz)   Height: 5' 9" (1.753 m)     General: NAD, well nourished   Eyes: no tearing, discharge, no erythema   ENT: moist mucous " membranes of the oral cavity, nares patent    Neck: Supple, full range of motion  Cardiovascular: Warm and well perfused, pulses equal and symmetrical  Lungs: Normal work of breathing, normal chest wall excursions  Skin: No rash, lesions, or breakdown on exposed skin  Psychiatry: Mood and affect are appropriate   Abdomen: soft, non tender, non distended  Extremeties: No cyanosis, clubbing or edema.    Neurological   MENTAL STATUS: Alert and oriented to person, place, and time. Attention and concentration within normal limits. Speech without dysarthria, able to name and repeat without difficulty. Recent and remote memory within normal limits   CRANIAL NERVES: Visual fields intact. PERRL. EOMI. Facial sensation intact. Face symmetrical. Hearing grossly intact. Full shoulder shrug bilaterally. Tongue protrudes midline   SENSORY: Sensation is intact to {sensation:28569} throughout.  Joint position perception intact. Negative Romberg.   MOTOR: Normal bulk and tone. No pronator drift.  5/5 deltoid, biceps, triceps, interosseous, hand  bilaterally. 5/5 iliopsoas, knee extension/flexion, foot dorsi/plantarflexion bilaterally.    REFLEXES: Symmetric and 2+ throughout. Toes down going bilaterally.   CEREBELLAR/COORDINATION/GAIT: Gait steady with normal arm swing and stride length.  Heel to shin intact. Finger to nose intact. Normal rapid alternating movements.   ***

## 2024-11-19 ENCOUNTER — PATIENT MESSAGE (OUTPATIENT)
Dept: NEUROLOGY | Facility: CLINIC | Age: 39
End: 2024-11-19
Payer: COMMERCIAL

## 2024-11-19 NOTE — PROGRESS NOTES
Name: Serena López  MRN: [id number]   CSN: [id number]      Date: 11/19/2024    Interval history 11/18/24:    - Since last seen, insurance didn't cover for brand keppra  - medrol dose pack gave no benefit  - taking keppra and xanax. Symptoms are better   - no clear triggers      Clinical history:    - three bad spells over the last year of several weeks each time  - last was Sept (Covid) - still from R foot and arm   - pulling up, feels tight, more pain  - now seeming more frequent  - cannot recall is steroids were ever helpful  - no recent additional immune panel sent        From Aug 023:  - bad aug-sept 2022, had spells of R arms and leg pulling  - was more sick around New Years  - has been quiet since Jan         From March 2022  - been pretty stable for two years  - last fall, she had a abdulkadir of R arm and R leg pulling for days, tried to push up the Keppra for a few days  - seems like she has more spells on the generic Keppra including side effects like word finding, slow to think, fatigue, soleepiness - did not have those on brand only in the past  - on LEV 2692-4388, -200, taking Xanax prn when she has more symptoms    From 1/14/2020:  - failed meds in [place] while traveling  - still on keppra and vimpat  - trying to get brand keppra again, insurance not covering  - not worried about fertility issues  - called insurance today    From Aug 2017:  - [facility]   - questions about fertility and meds  - questions about cost   - need for vimpat and keppra?    Last seen in Jan 2016:  1) Had more paroxysms this summer during a month without her period.  Took Xanax to cover.  2) Still taking Keppra/Vimpat.  None since August.  3) Still working as  in the community.    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    Been almost seizure free for 10 months, but then lat week, took a nap, felt she was screaming,  woke up a bit confused.  May have had a nocturnal.    Too many side effects:  1) Too sleepy  2) Headaches.  Thinks catamenial, happening every 2 weeks.  Tried OTC Tylenol, now on Fiorcet.  3) More word finding difficulties  4) Dizziness  5) No autonomic issues    But, well controlled on this combo of BRAND Keppra/Vimpat.  Last time she was without, she had a flurry of seizures.    ROS:  Review of Systems   Constitutional:  Positive for malaise/fatigue. Negative for weight loss.   HENT:  Negative for hearing loss.    Eyes:  Negative for blurred vision and double vision.   Respiratory:  Negative for shortness of breath and stridor.    Cardiovascular:  Negative for chest pain and palpitations.   Gastrointestinal:  Negative for constipation, nausea and vomiting.   Genitourinary:  Negative for frequency and urgency.   Musculoskeletal:  Negative for falls and myalgias.   Skin:  Negative for rash.   Neurological:  Positive for focal weakness and headaches. Negative for seizures.   Endo/Heme/Allergies:  Does not bruise/bleed easily.   Psychiatric/Behavioral:  Negative for depression, hallucinations and memory loss. The patient is not nervous/anxious.      Past Medical History: The patient  has a past medical history of Migraine and Seizures.    Social History: The patient  reports that she has quit smoking. Her smoking use included cigarettes. She has never used smokeless tobacco. She reports current alcohol use. She reports that she does not use drugs.    Family History: Their family history is not on file.    Allergies: Naprosyn [naproxen] and Opioids - morphine analogues     Meds:   Current Outpatient Medications on File Prior to Visit   Medication Sig Dispense Refill    ALPRAZolam (XANAX) 0.25 MG tablet TAKE 1 TABLET (0.25 MG TOTAL) BY MOUTH 3 (THREE) TIMES DAILY AS NEEDED FOR ANXIETY (SPASM). 60 tablet 0    butalbital-acetaminophen-caffeine -40 mg (FIORICET, ESGIC) -40 mg per tablet Take 1 tablet by mouth.   "    lacosamide (VIMPAT) 200 mg Tab tablet TAKE 1 TABLET BY MOUTH EVERY 12 HOURS 60 tablet 3    levETIRAcetam (KEPPRA) 1000 MG tablet TAKE 1 AND 1/2 TABLETS BY MOUTH 2 TIMES A DAYTAKE 1 AND 1/2 TABLETS BY MOUTH 2 TIMES A DAY 90 tablet 5    triamcinolone acetonide 0.1% (KENALOG) 0.1 % cream Apply topically 2 (two) times daily.      azelastine (ASTELIN) 137 mcg (0.1 %) nasal spray 2 sprays (274 mcg total) by Nasal route 2 (two) times daily. (Patient not taking: Reported on 1/7/2023) 30 mL 0    azithromycin (ZITHROMAX Z-AB) 250 MG tablet Take 2 tablets (500 mg) on  Day 1,  followed by 1 tablet (250 mg) once daily on Days 2 through 5. (Patient not taking: Reported on 11/18/2024) 6 tablet 0    mupirocin (BACTROBAN) 2 % ointment Apply topically 2 (two) times daily. As needed for minor skin excoriations (Patient not taking: Reported on 11/18/2024) 22 g 4    sumatriptan (IMITREX) 50 MG tablet Take 1 tablet (50 mg total) by mouth every 2 (two) hours as needed for Migraine (only 2 tabs per day max). 30 tablet 1    tretinoin (RETIN-A) 0.025 % cream Apply 0.025 % topically every 72 hours as needed.  3     No current facility-administered medications on file prior to visit.       Exam:  /69 (BP Location: Left arm, Patient Position: Sitting)   Pulse 80   Ht 5' 9" (1.753 m)   Wt 70 kg (154 lb 3.4 oz)   BMI 22.77 kg/m²     * Specialized movement exam  Normal speech.    No facial masking.   Normal tone throughout.     No bradykinesia.   No tremor with rest, posture, kinesis, or intention.    No other dystonia, chorea, athetosis, myoclonus, or tics.   No motor impersistence.   Gait is normal-based and steady with good stride length and normal arm swing.  Able to tandem walk.  No postural instability.      Laboratory/Radiological:  - Results:  Lab Visit on 10/10/2024   Component Date Value Ref Range Status    PAVAL,  Amphiphysin Ab, Serum 10/10/2024 Negative  Negative Final    PAVAL AGNA-1, Serum 10/10/2024 Negative  " Negative Final    PAVAL VANI-1, Serum 10/10/2024 Negative  Negative Final    PAVAL VANI-2, Serum 10/10/2024 Negative  Negative Final    PAVAL VANI-3, Serum 10/10/2024 Negative  Negative Final    CASPR2-IgG CBA 10/10/2024 Negative  Negative Final    Collapsin Response- Protei* 10/10/2024 Negative  Negative Final    DPPX Receptor AB, CBA IFA 10/10/2024 Negative  Negative Final    Glutamic Acid Decarb Ab 10/10/2024 0.00  <=0.02 nmol/L Final    LGI1-IgG CBA 10/10/2024 Negative  Negative Final    mGluR1 Ab, IFA, Serum 10/10/2024 Negative  Negative Final    NMDA-R Ab CBA, Serum 10/10/2024 Negative  Negative Final    P/Q Type Calcium Channel Ab 10/10/2024 0.00  <=0.02 nmol/L Final    PAVAL, PCA-1, Serum 10/10/2024 Negative  Negative Final    PAVAL, PCA-2, Serum 10/10/2024 Negative  Negative Final    PAVAL, PCA-Tr, Serum 10/10/2024 Negative  Negative Final    GRAF1 IFA,S 10/10/2024 Negative  Negative Final    IgLON5 CBA, SERUM 10/10/2024 Negative  Negative Final    ITPR1 IFA, S 10/10/2024 Negative  Negative Final    NIF IFA, S 10/10/2024 Negative  Negative Final    KLHK 11 Antibody CBA 10/10/2024 Negative  Negative Final    AP3B2 IFA 10/10/2024 Negative  Negative Final    GFAP IFA, S 10/10/2024 Negative  Negative Final    M SEPTIN-5 IFA, S 10/10/2024 Negative  Negative Final    M SEPTIN-7 IFA, S 10/10/2024 Negative  Negative Final    NEUROCHONDRIN, IFA, S 10/10/2024 Negative  Negative Final    JUVENAL-B-R Ab CBA, Serum 10/10/2024 Negative  Negative Final    TRIM46 Ab, IFA, Serum 10/10/2024 Negative  Negative Final    Phosphodesterase 10A (PDE10A) IgG,* 10/10/2024 Negative  Negative Final    Movement Disorder Interpretation 10/10/2024 SEE BELOW   Final    IFA NOTES 10/10/2024 None.   Final    AMPA-R Ab CBA, Serum 10/10/2024 Negative  Negative Final    WBC 10/10/2024 4.11  3.90 - 12.70 K/uL Final    RBC 10/10/2024 3.95 (L)  4.00 - 5.40 M/uL Final    Hemoglobin 10/10/2024 13.7  12.0 - 16.0 g/dL Final    Hematocrit  10/10/2024 39.8  37.0 - 48.5 % Final    MCV 10/10/2024 101 (H)  82 - 98 fL Final    MCH 10/10/2024 34.7 (H)  27.0 - 31.0 pg Final    MCHC 10/10/2024 34.4  32.0 - 36.0 g/dL Final    RDW 10/10/2024 11.6  11.5 - 14.5 % Final    Platelets 10/10/2024 182  150 - 450 K/uL Final    MPV 10/10/2024 11.9  9.2 - 12.9 fL Final    Immature Granulocytes 10/10/2024 0.0  0.0 - 0.5 % Final    Gran # (ANC) 10/10/2024 2.6  1.8 - 7.7 K/uL Final    Immature Grans (Abs) 10/10/2024 0.00  0.00 - 0.04 K/uL Final    Lymph # 10/10/2024 1.0  1.0 - 4.8 K/uL Final    Mono # 10/10/2024 0.5  0.3 - 1.0 K/uL Final    Eos # 10/10/2024 0.1  0.0 - 0.5 K/uL Final    Baso # 10/10/2024 0.01  0.00 - 0.20 K/uL Final    nRBC 10/10/2024 0  0 /100 WBC Final    Gran % 10/10/2024 62.1  38.0 - 73.0 % Final    Lymph % 10/10/2024 23.6  18.0 - 48.0 % Final    Mono % 10/10/2024 11.7  4.0 - 15.0 % Final    Eosinophil % 10/10/2024 2.4  0.0 - 8.0 % Final    Basophil % 10/10/2024 0.2  0.0 - 1.9 % Final    Differential Method 10/10/2024 Automated   Final    Sodium 10/10/2024 139  136 - 145 mmol/L Final    Potassium 10/10/2024 3.5  3.5 - 5.1 mmol/L Final    Chloride 10/10/2024 105  95 - 110 mmol/L Final    CO2 10/10/2024 25  23 - 29 mmol/L Final    Glucose 10/10/2024 85  70 - 110 mg/dL Final    BUN 10/10/2024 10  6 - 20 mg/dL Final    Creatinine 10/10/2024 0.7  0.5 - 1.4 mg/dL Final    Calcium 10/10/2024 9.5  8.7 - 10.5 mg/dL Final    Total Protein 10/10/2024 7.0  6.0 - 8.4 g/dL Final    Albumin 10/10/2024 4.2  3.5 - 5.2 g/dL Final    Total Bilirubin 10/10/2024 0.6  0.1 - 1.0 mg/dL Final    Alkaline Phosphatase 10/10/2024 68  55 - 135 U/L Final    AST 10/10/2024 19  10 - 40 U/L Final    ALT 10/10/2024 17  10 - 44 U/L Final    eGFR 10/10/2024 >60.0  >60 mL/min/1.73 m^2 Final    Anion Gap 10/10/2024 9  8 - 16 mmol/L Final    Levetiracetam Lvl 10/10/2024 38.0  3.0 - 60.0 ug/mL Final     - Independent review of images: none pertinent    Diagnoses:          1) Paroxysmal  kinesigenic dystonia / seizure. Movement disorders panel negative   2) Migraines.    Medical Decision Makin) BRAND ONLY KEPPRA - sending to [facility]   2) If insurance does not cover, we will consider transitioning from keppra to onfi   3) Follow up in 3 months             Jose Bal MD, MPH  Division of Movement and Memory Disorders  [facility]  [phone]    This is a patient with a serious and complex neurologic diagnosis whose overall, ongoing care is being managed and monitored by me and our Neurology clinic.   As such, since ,  is the appropriate add-on code to accompany the other E/M billing for this visit.

## 2025-01-29 DIAGNOSIS — G40.909 NOCTURNAL EPILEPSY: ICD-10-CM

## 2025-01-31 RX ORDER — LACOSAMIDE 200 MG/1
TABLET ORAL
Qty: 60 TABLET | Refills: 3 | Status: SHIPPED | OUTPATIENT
Start: 2025-01-31

## 2025-05-30 DIAGNOSIS — G24.8 PAROXYSMAL DYSTONIA: ICD-10-CM

## 2025-05-30 DIAGNOSIS — G40.909 NOCTURNAL EPILEPSY: ICD-10-CM

## 2025-05-30 DIAGNOSIS — G25.5 PAROXYSMAL KINESIGENIC CHOREOATHETOSIS: ICD-10-CM

## 2025-05-30 DIAGNOSIS — R56.9 SEIZURE: ICD-10-CM

## 2025-05-30 RX ORDER — LACOSAMIDE 200 MG/1
200 TABLET ORAL EVERY 12 HOURS
Qty: 60 TABLET | Refills: 3 | Status: SHIPPED | OUTPATIENT
Start: 2025-05-30

## 2025-05-30 RX ORDER — ALPRAZOLAM 0.25 MG/1
0.25 TABLET ORAL 3 TIMES DAILY PRN
Qty: 60 TABLET | Refills: 5 | Status: SHIPPED | OUTPATIENT
Start: 2025-05-30